# Patient Record
Sex: FEMALE | Race: BLACK OR AFRICAN AMERICAN | NOT HISPANIC OR LATINO | URBAN - METROPOLITAN AREA
[De-identification: names, ages, dates, MRNs, and addresses within clinical notes are randomized per-mention and may not be internally consistent; named-entity substitution may affect disease eponyms.]

---

## 2021-04-23 VITALS
OXYGEN SATURATION: 100 % | SYSTOLIC BLOOD PRESSURE: 178 MMHG | HEIGHT: 63 IN | HEART RATE: 73 BPM | WEIGHT: 212.31 LBS | RESPIRATION RATE: 18 BRPM | DIASTOLIC BLOOD PRESSURE: 91 MMHG | TEMPERATURE: 97 F

## 2021-04-23 RX ORDER — POVIDONE-IODINE 5 %
1 AEROSOL (ML) TOPICAL ONCE
Refills: 0 | Status: COMPLETED | OUTPATIENT
Start: 2021-06-14 | End: 2021-06-14

## 2021-04-23 NOTE — H&P ADULT - HISTORY OF PRESENT ILLNESS
Patient is 62 y/o female with low back pain present x  Patient elects to undergo L4/5 laminectomy, posterolateral fusion, TLIF with Dr. López  Patient is 62 y/o female with low back pain present x 2 years s/p work accident. She reports constant back pain. She has tried PT, oral analgesics and a cane.      Patient elects to undergo L4/5 laminectomy, posterolateral fusion, TLIF with Dr. López

## 2021-04-23 NOTE — H&P ADULT - NSHPPHYSICALEXAM_GEN_ALL_CORE
Gen: 62 y/o female, NAD  MSK: Decreased ROM secondary to pain at the lumbar spine       Rest of PE per MD clearance Gen: 62 y/o female, NAD  MSK: Decreased ROM secondary to pain at the lumbar spine   Calves soft, nontender bilaterally   Sensation intact to light touch bilateral lower extremities  DP 2+, brisk capillary refill, bilateral LE  EHL/FHL/TA/GS 5/5 bilaterally     Rest of PE per MD clearance

## 2021-04-23 NOTE — H&P ADULT - NSHPLABSRESULTS_GEN_ALL_CORE
Preop CBC, BMP, PT/PTT/INR, UA within normal limits- reviewed by medical clearance.   CXR: Within normal limits, per medical clearance.   Normal spirometry per medi clearance  Cr 0.65 preop testing  3M day of surgery   Preop EKG: NSR, reviewed by medical clearance. Preop CBC, BMP, PT/PTT/INR, UA within normal limits- reviewed by medical clearance.   CXR: Within normal limits, per medical clearance.   COVD PCR test: 06/12/21- negative.   Normal spirometry per medi clearance  Cr 0.65 preop testing  3M day of surgery   Preop EKG: NSR, reviewed by medical clearance.

## 2021-04-23 NOTE — H&P ADULT - PROBLEM SELECTOR PLAN 1
Admit to Orthopedic Service   For elective L4/5 lami/posterolateral fusion/TLIF   Medically cleared and optimized for surgery by Admit to Orthopedic Service   For elective L4/5 lami/posterolateral fusion/TLIF   Medically cleared and optimized for surgery by Dr Wiseman

## 2021-04-23 NOTE — H&P ADULT - NSICDXPASTMEDICALHX_GEN_ALL_CORE_FT
PAST MEDICAL HISTORY:  Anxiety     Breast cancer left    HLD (hyperlipidemia)     HTN (hypertension)     Kidney cysts benign    Lumbar herniated disc fall at work, 2019    OA (osteoarthritis)     Pain chronic left breast

## 2021-06-11 RX ORDER — ATORVASTATIN CALCIUM 80 MG/1
1 TABLET, FILM COATED ORAL
Qty: 0 | Refills: 0 | DISCHARGE

## 2021-06-11 RX ORDER — AMLODIPINE BESYLATE 2.5 MG/1
20 TABLET ORAL
Qty: 0 | Refills: 0 | DISCHARGE

## 2021-06-14 ENCOUNTER — INPATIENT (INPATIENT)
Facility: HOSPITAL | Age: 61
LOS: 3 days | Discharge: HOME HEALTH | DRG: 454 | End: 2021-06-18
Attending: ORTHOPAEDIC SURGERY | Admitting: ORTHOPAEDIC SURGERY
Payer: OTHER MISCELLANEOUS

## 2021-06-14 DIAGNOSIS — Z98.891 HISTORY OF UTERINE SCAR FROM PREVIOUS SURGERY: Chronic | ICD-10-CM

## 2021-06-14 DIAGNOSIS — N28.1 CYST OF KIDNEY, ACQUIRED: ICD-10-CM

## 2021-06-14 DIAGNOSIS — C50.919 MALIGNANT NEOPLASM OF UNSPECIFIED SITE OF UNSPECIFIED FEMALE BREAST: ICD-10-CM

## 2021-06-14 DIAGNOSIS — Z98.890 OTHER SPECIFIED POSTPROCEDURAL STATES: Chronic | ICD-10-CM

## 2021-06-14 DIAGNOSIS — E78.5 HYPERLIPIDEMIA, UNSPECIFIED: ICD-10-CM

## 2021-06-14 DIAGNOSIS — M43.10 SPONDYLOLISTHESIS, SITE UNSPECIFIED: ICD-10-CM

## 2021-06-14 DIAGNOSIS — I10 ESSENTIAL (PRIMARY) HYPERTENSION: ICD-10-CM

## 2021-06-14 LAB
ANION GAP SERPL CALC-SCNC: 13 MMOL/L — SIGNIFICANT CHANGE UP (ref 5–17)
BLD GP AB SCN SERPL QL: NEGATIVE — SIGNIFICANT CHANGE UP
BUN SERPL-MCNC: 10 MG/DL — SIGNIFICANT CHANGE UP (ref 7–23)
CALCIUM SERPL-MCNC: 9.6 MG/DL — SIGNIFICANT CHANGE UP (ref 8.4–10.5)
CHLORIDE SERPL-SCNC: 105 MMOL/L — SIGNIFICANT CHANGE UP (ref 96–108)
CO2 SERPL-SCNC: 23 MMOL/L — SIGNIFICANT CHANGE UP (ref 22–31)
CREAT SERPL-MCNC: 0.61 MG/DL — SIGNIFICANT CHANGE UP (ref 0.5–1.3)
GLUCOSE SERPL-MCNC: 118 MG/DL — HIGH (ref 70–99)
HCT VFR BLD CALC: 32.8 % — LOW (ref 34.5–45)
HGB BLD-MCNC: 10.7 G/DL — LOW (ref 11.5–15.5)
MAGNESIUM SERPL-MCNC: 1.7 MG/DL — SIGNIFICANT CHANGE UP (ref 1.6–2.6)
MCHC RBC-ENTMCNC: 30.1 PG — SIGNIFICANT CHANGE UP (ref 27–34)
MCHC RBC-ENTMCNC: 32.6 GM/DL — SIGNIFICANT CHANGE UP (ref 32–36)
MCV RBC AUTO: 92.1 FL — SIGNIFICANT CHANGE UP (ref 80–100)
NRBC # BLD: 0 /100 WBCS — SIGNIFICANT CHANGE UP (ref 0–0)
PHOSPHATE SERPL-MCNC: 2.9 MG/DL — SIGNIFICANT CHANGE UP (ref 2.5–4.5)
PLATELET # BLD AUTO: 219 K/UL — SIGNIFICANT CHANGE UP (ref 150–400)
POTASSIUM SERPL-MCNC: 3.4 MMOL/L — LOW (ref 3.5–5.3)
POTASSIUM SERPL-SCNC: 3.4 MMOL/L — LOW (ref 3.5–5.3)
RBC # BLD: 3.56 M/UL — LOW (ref 3.8–5.2)
RBC # FLD: 12.8 % — SIGNIFICANT CHANGE UP (ref 10.3–14.5)
RH IG SCN BLD-IMP: POSITIVE — SIGNIFICANT CHANGE UP
SODIUM SERPL-SCNC: 141 MMOL/L — SIGNIFICANT CHANGE UP (ref 135–145)
WBC # BLD: 6.06 K/UL — SIGNIFICANT CHANGE UP (ref 3.8–10.5)
WBC # FLD AUTO: 6.06 K/UL — SIGNIFICANT CHANGE UP (ref 3.8–10.5)

## 2021-06-14 PROCEDURE — 22853 INSJ BIOMECHANICAL DEVICE: CPT | Mod: AS

## 2021-06-14 PROCEDURE — 22840 INSERT SPINE FIXATION DEVICE: CPT | Mod: AS

## 2021-06-14 PROCEDURE — 22633 ARTHRD CMBN 1NTRSPC LUMBAR: CPT | Mod: AS

## 2021-06-14 PROCEDURE — 63047 LAM FACETEC & FORAMOT LUMBAR: CPT | Mod: AS,59

## 2021-06-14 PROCEDURE — 63056 DECOMPRESS SPINAL CORD LMBR: CPT | Mod: AS,59

## 2021-06-14 RX ORDER — LISINOPRIL 2.5 MG/1
20 TABLET ORAL DAILY
Refills: 0 | Status: DISCONTINUED | OUTPATIENT
Start: 2021-06-15 | End: 2021-06-16

## 2021-06-14 RX ORDER — HYDROMORPHONE HYDROCHLORIDE 2 MG/ML
30 INJECTION INTRAMUSCULAR; INTRAVENOUS; SUBCUTANEOUS
Refills: 0 | Status: DISCONTINUED | OUTPATIENT
Start: 2021-06-14 | End: 2021-06-15

## 2021-06-14 RX ORDER — HYDROCHLOROTHIAZIDE 25 MG
50 TABLET ORAL DAILY
Refills: 0 | Status: DISCONTINUED | OUTPATIENT
Start: 2021-06-15 | End: 2021-06-15

## 2021-06-14 RX ORDER — GABAPENTIN 400 MG/1
100 CAPSULE ORAL THREE TIMES A DAY
Refills: 0 | Status: DISCONTINUED | OUTPATIENT
Start: 2021-06-14 | End: 2021-06-18

## 2021-06-14 RX ORDER — HYDROMORPHONE HYDROCHLORIDE 2 MG/ML
0.5 INJECTION INTRAMUSCULAR; INTRAVENOUS; SUBCUTANEOUS
Refills: 0 | Status: DISCONTINUED | OUTPATIENT
Start: 2021-06-14 | End: 2021-06-15

## 2021-06-14 RX ORDER — ALPRAZOLAM 0.25 MG
1 TABLET ORAL
Qty: 0 | Refills: 0 | DISCHARGE

## 2021-06-14 RX ORDER — ONDANSETRON 8 MG/1
4 TABLET, FILM COATED ORAL EVERY 6 HOURS
Refills: 0 | Status: DISCONTINUED | OUTPATIENT
Start: 2021-06-14 | End: 2021-06-18

## 2021-06-14 RX ORDER — NALOXONE HYDROCHLORIDE 4 MG/.1ML
0.1 SPRAY NASAL
Refills: 0 | Status: DISCONTINUED | OUTPATIENT
Start: 2021-06-14 | End: 2021-06-18

## 2021-06-14 RX ORDER — LISINOPRIL 2.5 MG/1
1 TABLET ORAL
Qty: 0 | Refills: 0 | DISCHARGE

## 2021-06-14 RX ORDER — GABAPENTIN 400 MG/1
300 CAPSULE ORAL ONCE
Refills: 0 | Status: COMPLETED | OUTPATIENT
Start: 2021-06-14 | End: 2021-06-14

## 2021-06-14 RX ORDER — METOCLOPRAMIDE HCL 10 MG
10 TABLET ORAL ONCE
Refills: 0 | Status: DISCONTINUED | OUTPATIENT
Start: 2021-06-14 | End: 2021-06-18

## 2021-06-14 RX ORDER — MAGNESIUM HYDROXIDE 400 MG/1
30 TABLET, CHEWABLE ORAL EVERY 12 HOURS
Refills: 0 | Status: DISCONTINUED | OUTPATIENT
Start: 2021-06-14 | End: 2021-06-18

## 2021-06-14 RX ORDER — AMLODIPINE BESYLATE 2.5 MG/1
10 TABLET ORAL DAILY
Refills: 0 | Status: DISCONTINUED | OUTPATIENT
Start: 2021-06-14 | End: 2021-06-18

## 2021-06-14 RX ORDER — GABAPENTIN 400 MG/1
1 CAPSULE ORAL
Qty: 0 | Refills: 0 | DISCHARGE

## 2021-06-14 RX ORDER — ONDANSETRON 8 MG/1
4 TABLET, FILM COATED ORAL EVERY 6 HOURS
Refills: 0 | Status: DISCONTINUED | OUTPATIENT
Start: 2021-06-14 | End: 2021-06-14

## 2021-06-14 RX ORDER — SENNA PLUS 8.6 MG/1
2 TABLET ORAL AT BEDTIME
Refills: 0 | Status: DISCONTINUED | OUTPATIENT
Start: 2021-06-14 | End: 2021-06-18

## 2021-06-14 RX ORDER — POTASSIUM CHLORIDE 20 MEQ
20 PACKET (EA) ORAL ONCE
Refills: 0 | Status: COMPLETED | OUTPATIENT
Start: 2021-06-14 | End: 2021-06-15

## 2021-06-14 RX ORDER — AMLODIPINE BESYLATE 2.5 MG/1
1 TABLET ORAL
Qty: 0 | Refills: 0 | DISCHARGE

## 2021-06-14 RX ORDER — APREPITANT 80 MG/1
40 CAPSULE ORAL ONCE
Refills: 0 | Status: COMPLETED | OUTPATIENT
Start: 2021-06-14 | End: 2021-06-14

## 2021-06-14 RX ORDER — CEFAZOLIN SODIUM 1 G
2000 VIAL (EA) INJECTION EVERY 8 HOURS
Refills: 0 | Status: COMPLETED | OUTPATIENT
Start: 2021-06-15 | End: 2021-06-15

## 2021-06-14 RX ORDER — CHLORHEXIDINE GLUCONATE 213 G/1000ML
1 SOLUTION TOPICAL ONCE
Refills: 0 | Status: COMPLETED | OUTPATIENT
Start: 2021-06-14 | End: 2021-06-14

## 2021-06-14 RX ORDER — ACETAMINOPHEN 500 MG
975 TABLET ORAL EVERY 8 HOURS
Refills: 0 | Status: DISCONTINUED | OUTPATIENT
Start: 2021-06-14 | End: 2021-06-18

## 2021-06-14 RX ORDER — MELOXICAM 15 MG/1
1 TABLET ORAL
Qty: 0 | Refills: 0 | DISCHARGE

## 2021-06-14 RX ORDER — ALPRAZOLAM 0.25 MG
2 TABLET ORAL THREE TIMES A DAY
Refills: 0 | Status: DISCONTINUED | OUTPATIENT
Start: 2021-06-14 | End: 2021-06-18

## 2021-06-14 RX ORDER — SODIUM CHLORIDE 9 MG/ML
1000 INJECTION, SOLUTION INTRAVENOUS
Refills: 0 | Status: DISCONTINUED | OUTPATIENT
Start: 2021-06-14 | End: 2021-06-18

## 2021-06-14 RX ORDER — ACETAMINOPHEN 500 MG
1000 TABLET ORAL ONCE
Refills: 0 | Status: COMPLETED | OUTPATIENT
Start: 2021-06-14 | End: 2021-06-14

## 2021-06-14 RX ORDER — BUPIVACAINE 13.3 MG/ML
20 INJECTION, SUSPENSION, LIPOSOMAL INFILTRATION ONCE
Refills: 0 | Status: DISCONTINUED | OUTPATIENT
Start: 2021-06-14 | End: 2021-06-18

## 2021-06-14 RX ADMIN — Medication 1000 MILLIGRAM(S): at 15:31

## 2021-06-14 RX ADMIN — GABAPENTIN 300 MILLIGRAM(S): 400 CAPSULE ORAL at 15:30

## 2021-06-14 RX ADMIN — HYDROMORPHONE HYDROCHLORIDE 30 MILLILITER(S): 2 INJECTION INTRAMUSCULAR; INTRAVENOUS; SUBCUTANEOUS at 22:37

## 2021-06-14 RX ADMIN — SODIUM CHLORIDE 120 MILLILITER(S): 9 INJECTION, SOLUTION INTRAVENOUS at 21:08

## 2021-06-14 RX ADMIN — Medication 1 APPLICATION(S): at 15:29

## 2021-06-14 RX ADMIN — APREPITANT 40 MILLIGRAM(S): 80 CAPSULE ORAL at 15:31

## 2021-06-14 RX ADMIN — Medication 975 MILLIGRAM(S): at 22:57

## 2021-06-14 RX ADMIN — GABAPENTIN 100 MILLIGRAM(S): 400 CAPSULE ORAL at 22:58

## 2021-06-14 RX ADMIN — CHLORHEXIDINE GLUCONATE 1 APPLICATION(S): 213 SOLUTION TOPICAL at 15:31

## 2021-06-14 NOTE — BRIEF OPERATIVE NOTE - COMMENTS
Episodes of intraoperative bradyarrhythmia reported by anesthesia.  Plan for EKG in PACU and Cardiology consultation. Pt to go to telemetry postop

## 2021-06-14 NOTE — BRIEF OPERATIVE NOTE - NSICDXBRIEFPROCEDURE_GEN_ALL_CORE_FT
PROCEDURES:  TLIF, 1 level, posterior approach 14-Jun-2021 20:03:22 L4-L5 Alton Harden  Fusion, posterior spinal column, lumbar, 1 level, posterior approach 14-Jun-2021 20:03:44 L4-L5 Alton Harden

## 2021-06-14 NOTE — CONSULT NOTE ADULT - ASSESSMENT
Patient is 62 y/o female with low back pain present x 2 years s/p work accident who is now s/p L4/5 laminectomy/posterolateral fusion. During surgery patient had intraoperative bradyarrhythmia (per report by anesthesia) for which cardiology is consulted. Patient now in sinus.     #Concern for bradyarrhythmia  Patient now back in sinus (sinus arrhythmia). No strip of bradyarrhythmia but likely 2/2 anesthesia (received versed, , precedex, ketamine).   - continue on TELE  - avoid AV oswald blockade agents  - non-urgent TTE in AM    Recs not final until attested by cardiology attending.    62 y/o female with low back pain x 2 years s/p work accident who is now s/p L4/5 laminectomy/posterolateral fusion. During surgery patient had intraoperative bradyarrhythmia (per report by anesthesia) for which cardiology is consulted. Patient now in sinus.     #Concern for bradyarrhythmia  Patient now back in sinus (sinus arrhythmia). No strips of bradyarrhythmia reported but likely 2/2 anesthesia (received versed, , precedex, ketamine).   - continue on TELE  - avoid AV oswald blockade agents  - non-urgent TTE in AM    Recs not final until attested by cardiology attending.

## 2021-06-15 DIAGNOSIS — D62 ACUTE POSTHEMORRHAGIC ANEMIA: ICD-10-CM

## 2021-06-15 DIAGNOSIS — I10 ESSENTIAL (PRIMARY) HYPERTENSION: ICD-10-CM

## 2021-06-15 DIAGNOSIS — I49.9 CARDIAC ARRHYTHMIA, UNSPECIFIED: ICD-10-CM

## 2021-06-15 DIAGNOSIS — C50.919 MALIGNANT NEOPLASM OF UNSPECIFIED SITE OF UNSPECIFIED FEMALE BREAST: ICD-10-CM

## 2021-06-15 DIAGNOSIS — E78.1 PURE HYPERGLYCERIDEMIA: ICD-10-CM

## 2021-06-15 DIAGNOSIS — Z98.890 OTHER SPECIFIED POSTPROCEDURAL STATES: ICD-10-CM

## 2021-06-15 LAB
ANION GAP SERPL CALC-SCNC: 11 MMOL/L — SIGNIFICANT CHANGE UP (ref 5–17)
BASOPHILS # BLD AUTO: 0.01 K/UL — SIGNIFICANT CHANGE UP (ref 0–0.2)
BASOPHILS NFR BLD AUTO: 0.1 % — SIGNIFICANT CHANGE UP (ref 0–2)
BUN SERPL-MCNC: 11 MG/DL — SIGNIFICANT CHANGE UP (ref 7–23)
CALCIUM SERPL-MCNC: 9.3 MG/DL — SIGNIFICANT CHANGE UP (ref 8.4–10.5)
CHLORIDE SERPL-SCNC: 104 MMOL/L — SIGNIFICANT CHANGE UP (ref 96–108)
CO2 SERPL-SCNC: 24 MMOL/L — SIGNIFICANT CHANGE UP (ref 22–31)
COVID-19 SPIKE DOMAIN AB INTERP: NEGATIVE — SIGNIFICANT CHANGE UP
COVID-19 SPIKE DOMAIN ANTIBODY RESULT: 0.4 U/ML — SIGNIFICANT CHANGE UP
CREAT SERPL-MCNC: 0.65 MG/DL — SIGNIFICANT CHANGE UP (ref 0.5–1.3)
EOSINOPHIL # BLD AUTO: 0 K/UL — SIGNIFICANT CHANGE UP (ref 0–0.5)
EOSINOPHIL NFR BLD AUTO: 0 % — SIGNIFICANT CHANGE UP (ref 0–6)
GLUCOSE SERPL-MCNC: 139 MG/DL — HIGH (ref 70–99)
HCT VFR BLD CALC: 31 % — LOW (ref 34.5–45)
HCV AB S/CO SERPL IA: 0.03 S/CO — SIGNIFICANT CHANGE UP
HCV AB SERPL-IMP: SIGNIFICANT CHANGE UP
HGB BLD-MCNC: 10.2 G/DL — LOW (ref 11.5–15.5)
IMM GRANULOCYTES NFR BLD AUTO: 0.4 % — SIGNIFICANT CHANGE UP (ref 0–1.5)
LYMPHOCYTES # BLD AUTO: 0.58 K/UL — LOW (ref 1–3.3)
LYMPHOCYTES # BLD AUTO: 8 % — LOW (ref 13–44)
MCHC RBC-ENTMCNC: 29.5 PG — SIGNIFICANT CHANGE UP (ref 27–34)
MCHC RBC-ENTMCNC: 32.9 GM/DL — SIGNIFICANT CHANGE UP (ref 32–36)
MCV RBC AUTO: 89.6 FL — SIGNIFICANT CHANGE UP (ref 80–100)
MONOCYTES # BLD AUTO: 0.09 K/UL — SIGNIFICANT CHANGE UP (ref 0–0.9)
MONOCYTES NFR BLD AUTO: 1.2 % — LOW (ref 2–14)
NEUTROPHILS # BLD AUTO: 6.54 K/UL — SIGNIFICANT CHANGE UP (ref 1.8–7.4)
NEUTROPHILS NFR BLD AUTO: 90.3 % — HIGH (ref 43–77)
NRBC # BLD: 0 /100 WBCS — SIGNIFICANT CHANGE UP (ref 0–0)
PLATELET # BLD AUTO: 234 K/UL — SIGNIFICANT CHANGE UP (ref 150–400)
POTASSIUM SERPL-MCNC: 4.3 MMOL/L — SIGNIFICANT CHANGE UP (ref 3.5–5.3)
POTASSIUM SERPL-SCNC: 4.3 MMOL/L — SIGNIFICANT CHANGE UP (ref 3.5–5.3)
RBC # BLD: 3.46 M/UL — LOW (ref 3.8–5.2)
RBC # FLD: 12.8 % — SIGNIFICANT CHANGE UP (ref 10.3–14.5)
SARS-COV-2 IGG+IGM SERPL QL IA: 0.4 U/ML — SIGNIFICANT CHANGE UP
SARS-COV-2 IGG+IGM SERPL QL IA: NEGATIVE — SIGNIFICANT CHANGE UP
SODIUM SERPL-SCNC: 139 MMOL/L — SIGNIFICANT CHANGE UP (ref 135–145)
WBC # BLD: 7.25 K/UL — SIGNIFICANT CHANGE UP (ref 3.8–10.5)
WBC # FLD AUTO: 7.25 K/UL — SIGNIFICANT CHANGE UP (ref 3.8–10.5)

## 2021-06-15 PROCEDURE — 99253 IP/OBS CNSLTJ NEW/EST LOW 45: CPT

## 2021-06-15 PROCEDURE — 93306 TTE W/DOPPLER COMPLETE: CPT | Mod: 26

## 2021-06-15 PROCEDURE — 99223 1ST HOSP IP/OBS HIGH 75: CPT | Mod: GC

## 2021-06-15 RX ORDER — HYDROMORPHONE HYDROCHLORIDE 2 MG/ML
1 INJECTION INTRAMUSCULAR; INTRAVENOUS; SUBCUTANEOUS
Refills: 0 | Status: DISCONTINUED | OUTPATIENT
Start: 2021-06-15 | End: 2021-06-16

## 2021-06-15 RX ORDER — CYCLOBENZAPRINE HYDROCHLORIDE 10 MG/1
5 TABLET, FILM COATED ORAL THREE TIMES A DAY
Refills: 0 | Status: DISCONTINUED | OUTPATIENT
Start: 2021-06-15 | End: 2021-06-15

## 2021-06-15 RX ORDER — BENZOCAINE AND MENTHOL 5; 1 G/100ML; G/100ML
2 LIQUID ORAL EVERY 4 HOURS
Refills: 0 | Status: DISCONTINUED | OUTPATIENT
Start: 2021-06-15 | End: 2021-06-15

## 2021-06-15 RX ORDER — BENZOCAINE AND MENTHOL 5; 1 G/100ML; G/100ML
2 LIQUID ORAL EVERY 4 HOURS
Refills: 0 | Status: DISCONTINUED | OUTPATIENT
Start: 2021-06-15 | End: 2021-06-18

## 2021-06-15 RX ORDER — BENZOCAINE AND MENTHOL 5; 1 G/100ML; G/100ML
1 LIQUID ORAL ONCE
Refills: 0 | Status: DISCONTINUED | OUTPATIENT
Start: 2021-06-15 | End: 2021-06-15

## 2021-06-15 RX ORDER — OXYCODONE HYDROCHLORIDE 5 MG/1
30 TABLET ORAL EVERY 6 HOURS
Refills: 0 | Status: DISCONTINUED | OUTPATIENT
Start: 2021-06-15 | End: 2021-06-18

## 2021-06-15 RX ORDER — CYCLOBENZAPRINE HYDROCHLORIDE 10 MG/1
5 TABLET, FILM COATED ORAL THREE TIMES A DAY
Refills: 0 | Status: DISCONTINUED | OUTPATIENT
Start: 2021-06-15 | End: 2021-06-18

## 2021-06-15 RX ORDER — BENZOCAINE AND MENTHOL 5; 1 G/100ML; G/100ML
1 LIQUID ORAL ONCE
Refills: 0 | Status: COMPLETED | OUTPATIENT
Start: 2021-06-15 | End: 2021-06-15

## 2021-06-15 RX ADMIN — GABAPENTIN 100 MILLIGRAM(S): 400 CAPSULE ORAL at 13:48

## 2021-06-15 RX ADMIN — HYDROMORPHONE HYDROCHLORIDE 0.5 MILLIGRAM(S): 2 INJECTION INTRAMUSCULAR; INTRAVENOUS; SUBCUTANEOUS at 08:21

## 2021-06-15 RX ADMIN — HYDROMORPHONE HYDROCHLORIDE 0.5 MILLIGRAM(S): 2 INJECTION INTRAMUSCULAR; INTRAVENOUS; SUBCUTANEOUS at 12:14

## 2021-06-15 RX ADMIN — AMLODIPINE BESYLATE 10 MILLIGRAM(S): 2.5 TABLET ORAL at 09:45

## 2021-06-15 RX ADMIN — Medication 975 MILLIGRAM(S): at 00:27

## 2021-06-15 RX ADMIN — Medication 975 MILLIGRAM(S): at 21:08

## 2021-06-15 RX ADMIN — Medication 975 MILLIGRAM(S): at 15:03

## 2021-06-15 RX ADMIN — HYDROMORPHONE HYDROCHLORIDE 0.5 MILLIGRAM(S): 2 INJECTION INTRAMUSCULAR; INTRAVENOUS; SUBCUTANEOUS at 12:29

## 2021-06-15 RX ADMIN — Medication 50 MILLIEQUIVALENT(S): at 00:46

## 2021-06-15 RX ADMIN — BENZOCAINE AND MENTHOL 1 LOZENGE: 5; 1 LIQUID ORAL at 13:22

## 2021-06-15 RX ADMIN — Medication 975 MILLIGRAM(S): at 07:53

## 2021-06-15 RX ADMIN — GABAPENTIN 100 MILLIGRAM(S): 400 CAPSULE ORAL at 05:58

## 2021-06-15 RX ADMIN — LISINOPRIL 20 MILLIGRAM(S): 2.5 TABLET ORAL at 09:45

## 2021-06-15 RX ADMIN — Medication 2000 MILLIGRAM(S): at 00:32

## 2021-06-15 RX ADMIN — GABAPENTIN 100 MILLIGRAM(S): 400 CAPSULE ORAL at 21:08

## 2021-06-15 RX ADMIN — Medication 975 MILLIGRAM(S): at 05:58

## 2021-06-15 RX ADMIN — Medication 50 MILLIGRAM(S): at 06:19

## 2021-06-15 RX ADMIN — HYDROMORPHONE HYDROCHLORIDE 0.5 MILLIGRAM(S): 2 INJECTION INTRAMUSCULAR; INTRAVENOUS; SUBCUTANEOUS at 09:17

## 2021-06-15 RX ADMIN — Medication 975 MILLIGRAM(S): at 21:58

## 2021-06-15 RX ADMIN — Medication 975 MILLIGRAM(S): at 13:48

## 2021-06-15 RX ADMIN — Medication 2 MILLIGRAM(S): at 21:08

## 2021-06-15 RX ADMIN — Medication 2000 MILLIGRAM(S): at 07:44

## 2021-06-15 NOTE — CONSULT NOTE ADULT - ATTENDING COMMENTS
POD # 1. Pt seen and evaluated for pain control. Chart reviewed, plan discussed and agreed. Reports adequate pain control, no side effects. We will continue the current regimen, and titrate to side effects and pain control. Dr. Rojo

## 2021-06-15 NOTE — PROGRESS NOTE ADULT - SUBJECTIVE AND OBJECTIVE BOX
Ortho Post Op Check    Procedure: Lami, TLIF/PSF L4-L5   Surgeon: Maribel    Pt seen and examined in PACU, comfortable without complaints, pain controlled, in sinus. Denies CP, SOB, N/V, numbness/tingling     Vital Signs Last 24 Hrs  T(C): 36.2 (06-14-21 @ 20:46), Max: 36.2 (06-14-21 @ 20:46)  T(F): 97.2 (06-14-21 @ 20:46), Max: 97.2 (06-14-21 @ 20:46)  HR: 75 (06-14-21 @ 23:11) (63 - 80)  BP: 139/80 (06-14-21 @ 23:11) (131/67 - 188/98)  BP(mean): 104 (06-14-21 @ 23:11) (94 - 133)  RR: 18 (06-14-21 @ 23:11) (0 - 20)  SpO2: 99% (06-14-21 @ 23:11) (95% - 100%)  AVSS    General: Pt Alert and oriented, NAD  Back DSG C/D/I  HV x2 in place holding suction  Carter in place  Sensation intact BL LE  Motor intact BL LE    Drain output:    06-14-21 @ 07:01  -  06-15-21 @ 00:24  --------------------------------------------------------  OUT:    Accordian (mL): 40 mL    Accordian (mL): 30 mL  Total OUT: 70 mL      A/P: 61yFemale POD#0 s/p above procedure   - Stable  - Appreciate Cards recs - f/u TTE in AM  - Labs stable   - Pain Control  - DVT ppx: SCDs  - Post op abx: ancef periop  - PT, WBS: WBAT  - Dispo planning     Ortho Pager [phone]

## 2021-06-15 NOTE — PHYSICAL THERAPY INITIAL EVALUATION ADULT - ADDITIONAL COMMENTS
Pt lives in an elevator access apartment with family and has no steps to enter. Pt reports to use cane to ambulate prior to sx.

## 2021-06-15 NOTE — CONSULT NOTE ADULT - SUBJECTIVE AND OBJECTIVE BOX
Patient is a 61y old  Female who presents with a chief complaint of low back pain (15 Jose Alfredo 2021 09:51)      HPI:  Patient is 62 y/o female with low back pain present x 2 years s/p work accident. She reports constant back pain. She has tried PT, oral analgesics and a cane.      Patient elects to undergo L4/5 laminectomy, posterolateral fusion, TLIF with Dr. López  (2021 13:24)      PAST MEDICAL & SURGICAL HISTORY:  HTN (hypertension)    HLD (hyperlipidemia)  denies    Breast cancer  left    Lumbar herniated disc  fall at work, 2019    Kidney cysts  benign    OA (osteoarthritis)    Anxiety    Pain  chronic left breast    S/P   x4    S/P lumpectomy, left breast        FAMILY HISTORY:      SOCIAL HISTORY:  Smoking Status: [ ] Current, [ ] Former, [ ] Never  Pack Years:    MEDICATIONS:  Pulmonary:    Antimicrobials:    Anticoagulants:    Onc:    GI/:  magnesium hydroxide Suspension 30 milliLiter(s) Oral every 12 hours PRN  senna 2 Tablet(s) Oral at bedtime    Endocrine:    Cardiac:  amLODIPine   Tablet 10 milliGRAM(s) Oral daily  lisinopril 20 milliGRAM(s) Oral daily    Other Medications:  acetaminophen   Tablet .. 975 milliGRAM(s) Oral every 8 hours  ALPRAZolam 2 milliGRAM(s) Oral three times a day PRN  BUpivacaine liposome 1.3% Injectable (no eMAR) 20 milliLiter(s) Local Injection once  gabapentin 100 milliGRAM(s) Oral three times a day  HYDROmorphone  Injectable 0.5 milliGRAM(s) IV Push every 15 minutes PRN  HYDROmorphone  Injectable 1 milliGRAM(s) IV Push every 2 hours PRN  HYDROmorphone PCA (1 mG/mL) Rescue Clinician Bolus 0.5 milliGRAM(s) IV Push every 1 hour PRN  lactated ringers. 1000 milliLiter(s) IV Continuous <Continuous>  metoclopramide Injectable 10 milliGRAM(s) IV Push once PRN  naloxone Injectable 0.1 milliGRAM(s) IV Push every 3 minutes PRN  ondansetron Injectable 4 milliGRAM(s) IV Push every 6 hours PRN  oxyCODONE    IR 30 milliGRAM(s) Oral every 6 hours PRN      Allergies    No Known Allergies    Intolerances        Review of Systems:   •	General: negative  •	Skin/Breast: negative  •	Ophthalmologic: negative  •	ENMT: negative  •	Respiratory and Thorax: negative  •	Cardiovascular: negative  •	Gastrointestinal: negative  •	Genitourinary: negative  •	Musculoskeletal: negative  •	Neurological: negative  •	Psychiatric: negative  •	Hematology/Lymphatics: negative  •	Endocrine: negative  •	Allergic/Immunologic: negative    Physical Exam:   • Constitutional:	refer to dietitian/nutritionist note  • Eyes:	EOMI; PERRL; no drainage or redness  • ENMT:	No oral lesions; no gross abnormalities  • Neck	No bruits; no thyromegaly or nodules  • Breasts:	not examined  • Back:	No deformity or limitation of movement  • Respiratory:	Breath Sounds equal & clear to percussion & auscultation, no accessory muscle use  • Cardiovascular:	Regular rate & rhythm, normal S1, S2; no murmurs, gallops or rubs; no S3, S4  • Gastrointestinal:	Soft, non-tender, no hepatosplenomegaly, normal bowel sounds  • Genitourinary:	not examined  • Rectal: not examined  • Extremities:	No cyanosis, clubbing or edema  • Vascular:	Equal and normal pulses (carotid, femoral, dorsalis pedis)  • Neurologica:l	not examined  • Skin:	No lesions; no rash  • Lymph Nodes:	No lymphadedenopathy  • Musculoskeletal:	No joint pain, swelling or deformity; no limitation of movement      Vital Signs Last 24 Hrs  T(C): 36.2 (15 Jose Lafredo 2021 06:00), Max: 36.2 (2021 20:46)  T(F): 97.1 (15 Jose Alfredo 2021 06:00), Max: 97.2 (2021 20:46)  HR: 54 (15 Jose Alfredo 2021 09:00) (54 - 80)  BP: 115/68 (15 Jose Alfredo 2021 09:00) (114/66 - 188/98)  BP(mean): 87 (15 Jose Alfredo 2021 09:00) (85 - 133)  RR: 15 (15 Jose Alfredo 2021 09:00) (0 - 20)  SpO2: 99% (15 Jose Alfredo 2021 09:00) (95% - 100%)    -14 @ 07:01  -  -15 @ 07:00  --------------------------------------------------------  IN: 1200 mL / OUT: 755 mL / NET: 445 mL    06-15 @ 07:01  -  06-15 @ 10:23  --------------------------------------------------------  IN: 120 mL / OUT: 150 mL / NET: -30 mL          LABS:      CBC Full  -  ( 15 Jose Alfredo 2021 05:09 )  WBC Count : 7.25 K/uL  RBC Count : 3.46 M/uL  Hemoglobin : 10.2 g/dL  Hematocrit : 31.0 %  Platelet Count - Automated : 234 K/uL  Mean Cell Volume : 89.6 fl  Mean Cell Hemoglobin : 29.5 pg  Mean Cell Hemoglobin Concentration : 32.9 gm/dL  Auto Neutrophil # : 6.54 K/uL  Auto Lymphocyte # : 0.58 K/uL  Auto Monocyte # : 0.09 K/uL  Auto Eosinophil # : 0.00 K/uL  Auto Basophil # : 0.01 K/uL  Auto Neutrophil % : 90.3 %  Auto Lymphocyte % : 8.0 %  Auto Monocyte % : 1.2 %  Auto Eosinophil % : 0.0 %  Auto Basophil % : 0.1 %    06-15    139  |  104  |  11  ----------------------------<  139<H>  4.3   |  24  |  0.65    Ca    9.3      15 Jose Alfredo 2021 05:09  Phos  2.9       Mg     1.7                             RADIOLOGY & ADDITIONAL STUDIES (The following images were personally reviewed):

## 2021-06-15 NOTE — CONSULT NOTE ADULT - SUBJECTIVE AND OBJECTIVE BOX
Pain Management Consult Note - [facility] [phone]    Chief Complaint: low back pain     HPI: Patient seen and examined today. This is a 62 y/o female PMH of HLD, OA, kidney cysts, breast cancer, HTN POD #1 s/p TLIF/PSF L4-5 with Dr. López. Patient reports back pain began in 2019 after a fall. Patient reports pain radiates down the BLEs associated with numbness and tingling. At home, patient reports taking Oxycodone 30 mg PO QID, which she states is prescribed for her breast cancer. Patient I-STOP positive for Oxycodone 30 mg PO QID, Xanax 2 mg PO TID last filled 2021. Patient as placed on Dilaudid 0.3 mg PCA overnight and reports this did not adequately manage her pain. Patient was switched to Oxycodone 30 mg PO Q6 hr PRN and Dilaudid 1 mg PO Q2h PRN and reports adequate pain control. Patient denies side effects from current pain regimen.    Pain is _X__ sharp ____dull ___burning ___achy ___ Intensity: ____ mild _X__mod _X__severe     Location __X__surgical site ____cervical ___X__lumbar ____abd ____upper ext____lower ext    Worse with X____activity __X__movement _____physical therapy___ Rest    Improved with __X__medication ____rest ____physical therapy    ROS: Const:  _N__febrile   Eyes:_N__ENT:___CV: _N__chest pain  Resp: __N__sob  GI:__N_nausea _N__vomiting _N__abd pain ___npo ___clears _Y_full diet __bm  :___ Musk: __Y_pain _N__spasm  Skin:___ Neuro:  __N_sedation__N_confusion_Y__ numbness _N__weakness ___Nparesth  Psych:_Y_anxiety  Endo:___ Heme:N___Allergy:_____NKDA____, _Y__all others reviewed and negative    PAST MEDICAL & SURGICAL HISTORY:  HTN (hypertension)  HLD (hyperlipidemia)-denies  Breast cancer-left  Lumbar herniated disc-fall at work, 2019  Kidney cysts-benign  OA (osoarthritis)  Anxiety  Pain-chronic left breast  S/P [id number]  S/P lumpectomy, left breast    SH: _N__Tobacco   _N__Alcohol                          FH:FAMILY HISTORY: Mother:  of heart attack    acetaminophen   Tablet .. 975 milliGRAM(s) Oral every 8 hours  ALPRAZolam 2 milliGRAM(s) Oral three times a day PRN  amLODIPine   Tablet 10 milliGRAM(s) Oral daily  benzocaine 15 mG/menthol 3.6 mG Lozenge 1 Lozenge Oral once PRN  BUpivacaine liposome 1.3% Injectable (no eMAR) 20 milliLiter(s) Local Injection once  gabapentin 100 milliGRAM(s) Oral three times a day  HYDROmorphone  Injectable 0.5 milliGRAM(s) IV Push every 15 minutes PRN  HYDROmorphone  Injectable 1 milliGRAM(s) IV Push every 2 hours PRN  HYDROmorphone PCA (1 mG/mL) Rescue Clinician Bolus 0.5 milliGRAM(s) IV Push every 1 hour PRN  lactated ringers. 1000 milliLiter(s) IV Continuous <Continuous>  lisinopril 20 milliGRAM(s) Oral daily  magnesium hydroxide Suspension 30 milliLiter(s) Oral every 12 hours PRN  metoclopramide Injectable 10 milliGRAM(s) IV Push once PRN  naloxone Injectable 0.1 milliGRAM(s) IV Push every 3 minutes PRN  ondansetron Injectable 4 milliGRAM(s) IV Push every 6 hours PRN  oxyCODONE    IR 30 milliGRAM(s) Oral every 6 hours PRN  senna 2 Tablet(s) Oral at bedtime      T(C): 36.2 (06-15-21 @ 06:00), Max: 36.2 (21 @ 20:46)  HR: 66 (06-15-21 @ 12:40) (54 - 80)  BP: 104/59 (06-15-21 @ 12:00) (104/59 - 188/98)  RR: 13 (06-15-21 @ 12:40) (0 - 22)  SpO2: 99% (06-15-21 @ 12:40) (95% - 100%)  Wt(kg): --    T(C): 36.2 (06-15-21 @ 06:00), Max: 36.2 (21 @ 20:46)  HR: 66 (06-15-21 @ 12:40) (54 - 80)  BP: 104/59 (06-15-21 @ 12:00) (104/59 - 188/98)  RR: 13 (06-15-21 @ 12:40) (0 - 22)  SpO2: 99% (06-15-21 @ 12:40) (95% - 100%)  Wt(kg): --    T(C): 36.2 (06-15-21 @ 06:00), Max: 36.2 (21 @ 20:46)  HR: 66 (06-15-21 @ 12:40) (54 - 80)  BP: 104/59 (06-15-21 @ 12:00) (104/59 - 188/98)  RR: 13 (06-15-21 @ 12:40) (0 - 22)  SpO2: 99% (06-15-21 @ 12:40) (95% - 100%)  Wt(kg): --    PHYSICAL EXAM:  Gen Appearance: _X__no acute distress __X_appropriate        Neuro: _X__SILT feet____ EOM Intact Psych: AAOX3__, X___mood/affect appropriate        Eyes: __X_conjunctiva WNL  ____X_ Pupils equal and round        ENT: X___ears and nose atraumatic__X_ Hearing grossly intact        Neck: _X__trachea midline, no visible masses ___thyroid without palpable mass    Resp: __X_Nml WOB____No tactile fremitus ___clear to auscultation    Cardio: __X_extremities free from edema ____pedal pulses palpable    GI/Abdomen: ___soft _____ Nontender___X___Nondistended_____HSM    Lymphatic: ___no palpable nodes in neck  ___no palpable nodes calves and feet    Skin/Wound: _X__Incision, __X_Dressing c/d/i,   _X___surrounding tissues soft,  _X__drain/chest tube present____    Muscular: EHL _5__/5  Gastrocnemius___5/5    __X_absent clubbing/cyanosis      ASSESSMENT: This is a 61y old Female with a history of HLD, OA, kidney cysts, breast cancer, HTN POD #1 s/p TLIF/PSF L4-5 with Dr. López.    Recommended Treatment PLAN:  1. Patient reports adequate pain control with current pain regimen. Continue Oxycodone 30 mg PO Q6 hr PRN severe pain   2. Continue Dilaudid 1 mg PO Q2h PRN breakthrough pain  3. Continue Tylenol 975 mg PO TID  4. Start Flexeril 5 mg PO Q8h PRN muscle spasm  Plan discussed with Dr. Rojo

## 2021-06-15 NOTE — CONSULT NOTE ADULT - TIME BILLING
reviewed
Patient seen and examined with house-staff during bedside rounds.  Resident note read, including vitals, physical findings, laboratory data, and radiological reports.   Revisions included below.  Direct personal management at bed side and extensive interpretation of the data.  Plan was outlined and discussed in details with the housestaff.  Decision making of high complexity  Action taken for acute disease activity to reflect the level of care provided:  - medication reconciliation  - review laboratory data

## 2021-06-15 NOTE — CONSULT NOTE ADULT - NSCONSULTADDITIONALINFOA_GEN_ALL_CORE
POD nu 1  s/p L spine  OOB  PT  VTE: DCD  HTN: controlled  Ca Breast--no rx indicated.  Pain controlled  Medication reviewed

## 2021-06-15 NOTE — PROGRESS NOTE ADULT - SUBJECTIVE AND OBJECTIVE BOX
INTERVAL EVENTS:  naeo  telemetry reivewed, no bradyarrhythmias      MEDICATIONS  (STANDING):  acetaminophen   Tablet .. 975 milliGRAM(s) Oral every 8 hours  amLODIPine   Tablet 10 milliGRAM(s) Oral daily  BUpivacaine liposome 1.3% Injectable (no eMAR) 20 milliLiter(s) Local Injection once  gabapentin 100 milliGRAM(s) Oral three times a day  lactated ringers. 1000 milliLiter(s) (120 mL/Hr) IV Continuous <Continuous>  lisinopril 20 milliGRAM(s) Oral daily  senna 2 Tablet(s) Oral at bedtime    MEDICATIONS  (PRN):  ALPRAZolam 2 milliGRAM(s) Oral three times a day PRN anxiety  cyclobenzaprine 5 milliGRAM(s) Oral three times a day PRN Muscle Spasm  HYDROmorphone  Injectable 0.5 milliGRAM(s) IV Push every 15 minutes PRN breakthrough pain  HYDROmorphone  Injectable 1 milliGRAM(s) IV Push every 2 hours PRN breakthrough pain  magnesium hydroxide Suspension 30 milliLiter(s) Oral every 12 hours PRN Constipation  metoclopramide Injectable 10 milliGRAM(s) IV Push once PRN Nausea and/or Vomiting  naloxone Injectable 0.1 milliGRAM(s) IV Push every 3 minutes PRN For ANY of the following changes in patient status:  A. RR LESS THAN 10 breaths per minute, B. Oxygen saturation LESS THAN 90%, C. Sedation score of 6  ondansetron Injectable 4 milliGRAM(s) IV Push every 6 hours PRN Nausea  oxyCODONE    IR 30 milliGRAM(s) Oral every 6 hours PRN Severe Pain (7 - 10)      Home Medications:  amLODIPine 10 mg oral tablet: 1 tab(s) orally once a day (14 Jun 2021 15:45)  gabapentin 100 mg oral capsule: 1 cap(s) orally 3 times a day (14 Jun 2021 15:45)  hydroCHLOROthiazide 50 mg oral tablet: 1 tab(s) orally once a day (14 Jun 2021 15:45)  lisinopril 20 mg oral tablet: 1 tab(s) orally once a day (14 Jun 2021 15:45)  meloxicam 7.5 mg oral tablet: 1 tab(s) orally once a day, As Needed (14 Jun 2021 15:45)  oxyCODONE 30 mg oral tablet: 1 tab(s) orally every 6 hours, As Needed (14 Jun 2021 15:45)  Xanax 2 mg oral tablet: 1 tab(s) orally 3 times a day, As Needed (14 Jun 2021 15:45)      Vital Signs Last 24 Hrs  T(C): 36.1 (15 Jose Alfredo 2021 14:00), Max: 36.2 (14 Jun 2021 20:46)  T(F): 97 (15 Jose Alfredo 2021 14:00), Max: 97.2 (14 Jun 2021 20:46)  HR: 73 (15 Jose Alfredo 2021 15:00) (45 - 91)  BP: 97/53 (15 Jose Alfredo 2021 14:48) (96/52 - 188/98)  BP(mean): 71 (15 Jose Alfredo 2021 14:48) (68 - 133)  RR: 15 (15 Jose Alfredo 2021 15:00) (0 - 22)  SpO2: 100% (15 Jose Alfredo 2021 15:00) (95% - 100%)     PHYSICAL EXAM:  GEN: Awake, alert. NAD.   HEENT: NCAT, PERRL, EOMI. Mucosa moist. No JVD.  RESP: CTA b/l  CV: RRR. Normal S1/S2. No m/r/g.  ABD: Soft. NT/ND. BS+  EXT: Warm. No edema, clubbing, or cyanosis.   NEURO: AAOx3. No focal deficits.     LABS:                        10.2   7.25  )-----------( 234      ( 15 Jose Alfredo 2021 05:09 )             31.0     06-15    139  |  104  |  11  ----------------------------<  139<H>  4.3   |  24  |  0.65    Ca    9.3      15 Jose Alfredo 2021 05:09  Phos  2.9     06-14  Mg     1.7     06-14              I&O's Summary    14 Jun 2021 07:01  -  15 Jose Alfredo 2021 07:00  --------------------------------------------------------  IN: 1200 mL / OUT: 755 mL / NET: 445 mL    15 Jose Alfredo 2021 07:01  -  15 Jose Alfredo 2021 15:42  --------------------------------------------------------  IN: 1020 mL / OUT: 535 mL / NET: 485 mL      A/P:  62 y/o female with low back pain x 2 years s/p work accident who is now s/p L4/5 laminectomy/posterolateral fusion. During surgery patient had intraoperative bradyarrhythmia (per report by anesthesia) for which cardiology is consulted. Patient now in sinus.     #Concern for bradyarrhythmia  Patient now back in sinus (sinus arrhythmia). No strips of bradyarrhythmia reported but likely 2/2 anesthesia (received versed, , precedex, ketamine).   Telemetry reviewed, no bradyarrthymias      Please reconsult as needed  D/w cardiology attending  Skinny Mercado MD PGY4

## 2021-06-15 NOTE — CONSULT NOTE ADULT - SUBJECTIVE AND OBJECTIVE BOX
STEPHANY GUZMAN      Patient is a 61y old  Female who presents with a chief complaint of low back pain (15 Jose Alfredo 2021 13:14)      HPI:  Patient is 62 y/o female with low back pain present x 2 years s/p work accident. She reports constant back pain. She has tried PT, oral analgesics and a cane.      Patient elects to undergo L4/5 laminectomy, posterolateral fusion, TLIF with Dr. López  (2021 13:24)      Addl  Medical issues:       HEALTH ISSUES - PROBLEM Dx:  Spondylolisthesis  Spondylolisthesis    HTN (hypertension)  HTN (hypertension)    HLD (hyperlipidemia)  HLD (hyperlipidemia)    Kidney cysts  Kidney cysts    Breast cancer  Breast cancer    Pure hypertriglyceridemia  Pure hypertriglyceridemia    Essential hypertension  Essential hypertension    Malignant neoplasm of female breast, unspecified estrogen receptor status, unspecified laterality, unspecified site of breast  Malignant neoplasm of female breast, unspecified estrogen receptor status, unspecified laterality, unspecified site of breast    Anemia due to acute blood loss  Anemia due to acute blood loss    Postoperative state  Postoperative state    Cardiac arrhythmia, unspecified cardiac arrhythmia type  Cardiac arrhythmia, unspecified cardiac arrhythmia type              MEDICATIONS  (STANDING):  acetaminophen   Tablet .. 975 milliGRAM(s) Oral every 8 hours  amLODIPine   Tablet 10 milliGRAM(s) Oral daily  BUpivacaine liposome 1.3% Injectable (no eMAR) 20 milliLiter(s) Local Injection once  gabapentin 100 milliGRAM(s) Oral three times a day  lactated ringers. 1000 milliLiter(s) (120 mL/Hr) IV Continuous <Continuous>  lisinopril 20 milliGRAM(s) Oral daily  senna 2 Tablet(s) Oral at bedtime    MEDICATIONS  (PRN):  ALPRAZolam 2 milliGRAM(s) Oral three times a day PRN anxiety  benzocaine 15 mG/menthol 3.6 mG (Sugar-Free) Lozenge 2 Lozenge Oral every 4 hours PRN Sore Throat  cyclobenzaprine 5 milliGRAM(s) Oral three times a day PRN Muscle Spasm  HYDROmorphone  Injectable 1 milliGRAM(s) IV Push every 2 hours PRN breakthrough pain  magnesium hydroxide Suspension 30 milliLiter(s) Oral every 12 hours PRN Constipation  metoclopramide Injectable 10 milliGRAM(s) IV Push once PRN Nausea and/or Vomiting  naloxone Injectable 0.1 milliGRAM(s) IV Push every 3 minutes PRN For ANY of the following changes in patient status:  A. RR LESS THAN 10 breaths per minute, B. Oxygen saturation LESS THAN 90%, C. Sedation score of 6  ondansetron Injectable 4 milliGRAM(s) IV Push every 6 hours PRN Nausea  oxyCODONE    IR 30 milliGRAM(s) Oral every 6 hours PRN Severe Pain (7 - 10)          PAST MEDICAL & SURGICAL HISTORY:  HTN (hypertension)    HLD (hyperlipidemia)  denies    Breast cancer  left    Lumbar herniated disc  fall at work, 2019    Kidney cysts  benign    OA (osteoarthritis)    Anxiety    Pain  chronic left breast    S/P   x4    S/P lumpectomy, left breast        REVIEW OF SYSTEMS:  [x] As per HPI          Reviewed   no change                            Changes noted  CONSTITUTIONAL: No fever, weight loss, or fatigue  RESPIRATORY: No cough, wheezing, chills or hemoptysis; No Shortness of Breath  CARDIOVASCULAR: No chest pain, palpitations, dizziness, or leg swelling  GASTROINTESTINAL: No abdominal or epigastric pain. No nausea, vomiting, or hematemesis; No diarrhea or constipation. No melena or hematochezia.  MUSCULOSKELETAL: No joint pain or swelling; No muscle, back, or extremity pain  Neuro:   Grossly  Negative  Psych        Awake  alert  [x] All others negative	  [ ] Unable to obtain      Vital Signs Last 24 Hrs  T(C): 36.8 (15 Jose Alfredo 2021 21:15), Max: 36.8 (15 Jose Alfredo 2021 21:15)  T(F): 98.2 (15 Jose Alfredo 2021 21:15), Max: 98.2 (15 Jose Alfredo 2021 21:15)  HR: 83 (15 Jose Alfredo 2021 21:15) (45 - 101)  BP: 136/63 (15 Jose Alfredo 2021 21:15) (94/54 - 171/85)  BP(mean): 71 (15 Jose Alfredo 2021 14:48) (68 - 117)  RR: 15 (15 Jose Alfredo 2021 21:15) (12 - 29)  SpO2: 99% (15 Jose Alfredo 2021 21:15) (95% - 100%)    PHYSICAL EXAM:      Constitutional:    PHYSICAL EXAM:      Constitutional: NAD, well-groomed, well-developed  HEENT: PERRLA, EOMI, Normal Hearing, MMM  Neck: No LAD, No JVD  Back: Normal spine flexure, No CVA tenderness  Respiratory: CTAB  Cardiovascular: S1 and S2, RRR, no M/G/R  Gastrointestinal: BS+, soft, NT/ND  Extremities: No peripheral edema  Vascular: 2+ peripheral pulses  Neurological: A/O x 3, no focal deficits  Psychiatric: Normal mood, normal affect  Musculoskeletal: 5/5 strength b/l upper and lower extremities  s/p L spine  Skin: No rashes                                  10.2   7.25  )-----------( 234      ( 15 Jose Alfredo 2021 05:09 )             31.0     06-15    139  |  104  |  11  ----------------------------<  139<H>  4.3   |  24  |  0.65    Ca    9.3      15 Jose Alfredo 2021 05:09  Phos  2.9     06-14  Mg     1.7     06-14            CAPILLARY BLOOD GLUCOSE          I&O's Summary    2021 07:  -  15 Jose Alfredo 2021 07:00  --------------------------------------------------------  IN: 1200 mL / OUT: 755 mL / NET: 445 mL    15 Jose Alfredo 2021 07:  -  15 Jose Alfredo 2021 23:30  --------------------------------------------------------  IN: 1140 mL / OUT: 1178 mL / NET: -38 mL            ASSESSMENT/PLAN/RECOMMENDATIONS

## 2021-06-15 NOTE — PHYSICAL THERAPY INITIAL EVALUATION ADULT - PERTINENT HX OF CURRENT PROBLEM, REHAB EVAL
Patient is 62 y/o female with low back pain present x 2 years s/p work accident. She reports constant back pain. She has tried PT, oral analgesics and a cane.

## 2021-06-15 NOTE — PROGRESS NOTE ADULT - SUBJECTIVE AND OBJECTIVE BOX
Ortho Note      Subjective:  Pt comfortable without complaints, pain controlled with Dilaudid PCA. Gillis in place. x2 hv's in place.   Denies CP, SOB, N/V, numbness/tingling       Vital Signs Last 24 Hrs  T(C): 36.2 (06-15-21 @ 06:00), Max: 36.2 (06-15-21 @ 06:00)  T(F): 97.1 (06-15-21 @ 06:00), Max: 97.1 (06-15-21 @ 06:00)  HR: 54 (06-15-21 @ 09:00) (54 - 65)  BP: 115/68 (06-15-21 @ 09:00) (114/66 - 171/85)  BP(mean): 87 (06-15-21 @ 09:00) (85 - 117)  RR: 15 (06-15-21 @ 09:00) (12 - 20)  SpO2: 99% (06-15-21 @ 09:00) (99% - 100%)  AVSS      Objective:    Physical Exam:  General: Pt Alert and oriented, NAD  Lumbar DSG C/D/I  Pulses: +2 pedal pulses, wwp toes, cap refill less than 3 seconds  Sensation: [place] intact  Motor: EHL/FHL/TA/GS- firing        Plan of Care:  A/P: 61yFemale POD#1 s/p TLIF/PSF L4-L5  - afebrile, nontoxic apperance  - Pain Control  - DVT ppx: SCDS  - PT, WBS: WBAT  - monitor drain output  - d/c gillis- TOV  - bowel regimen, IS use  - Dispo- pending pt eval, drain d/c    Ortho Pager [phone] Ortho Note      Subjective:  Pt comfortable without complaints, pain controlled with Dilaudid PCA. Gillis in place. x2 hv's in place.   Denies CP, SOB, N/V, numbness/tingling       Vital Signs Last 24 Hrs  T(C): 36.2 (06-15-21 @ 06:00), Max: 36.2 (06-15-21 @ 06:00)  T(F): 97.1 (06-15-21 @ 06:00), Max: 97.1 (06-15-21 @ 06:00)  HR: 54 (06-15-21 @ 09:00) (54 - 65)  BP: 115/68 (06-15-21 @ 09:00) (114/66 - 171/85)  BP(mean): 87 (06-15-21 @ 09:00) (85 - 117)  RR: 15 (06-15-21 @ 09:00) (12 - 20)  SpO2: 99% (06-15-21 @ 09:00) (99% - 100%)  AVSS      Objective:    Physical Exam:  General: Pt Alert and oriented, NAD  Lumbar DSG C/D/I  Pulses: +2 pedal pulses, wwp toes, cap refill less than 3 seconds  Sensation: [place] intact  Motor: EHL/FHL/TA/GS- firing        Plan of Care:  A/P: 61yFemale POD#1 s/p TLIF/PSF L4-L5  - afebrile, nontoxic apperance  - Pain Control  - DVT ppx: SCDS  - PT, WBS: WBAT  - monitor drain output  - d/c gillis- TOV  - bowel regimen, IS use-  - TTE  - Appreciate pain management Recs  - Dispo- pending pt eval, drain d/c    Ortho Pager [phone]

## 2021-06-15 NOTE — PROGRESS NOTE ADULT - SUBJECTIVE AND OBJECTIVE BOX
Orthopaedic Spine Resident Note    S:  Pain well controlled.    No fevers/chills/shortness of breath/chest pain/new neurologic complaints.  Patient with episodic bradycardia overnight, stayed in PACU  Remainder of VS stable overnight    O:  Vital Signs Last 24 Hrs  T(C): 36.2 (15 Jose Alfredo 2021 06:00), Max: 36.2 (14 Jun 2021 20:46)  T(F): 97.1 (15 Jose Alfredo 2021 06:00), Max: 97.2 (14 Jun 2021 20:46)  HR: 55 (15 Jose Alfredo 2021 09:00) (55 - 80)  BP: 115/68 (15 Jose Alfredo 2021 09:00) (114/66 - 188/98)  BP(mean): 87 (15 Jose Alfredo 2021 09:00) (85 - 133)  RR: 15 (15 Jose Alfredo 2021 09:00) (0 - 20)  SpO2: 100% (15 Jose Alfredo 2021 09:00) (95% - 100%)  General: Well-appearing adult Female lying supine; NAD; A&Ox3; Carter in place  Back: Abd Dressing CDI  Hemovac drains x 2  with 110 and 90  cc sanguinous output since OR  Motor:  LLE- Hip Flex/Knee Ext/TA/EHL/GS 5/5 strength  RLE- Hip Flex/Knee Ext/TA/EHL/GS 5/5 strength  Sensory:  LLE- SILT L2-S1 dermatomes   RLE- SILT L2-S1 dermatomes  Vascular:  Feet WWP; DP 2+ bilaterally; Cap refill < 2 sec    I&O's Detail    14 Jun 2021 07:01  -  15 Jose Alfredo 2021 07:00  --------------------------------------------------------  IN:    IV PiggyBack: 100 mL    Lactated Ringers: 1100 mL  Total IN: 1200 mL    OUT:    Accordian (mL): 110 mL    Accordian (mL): 90 mL    Indwelling Catheter - Urethral (mL): 555 mL  Total OUT: 755 mL    Total NET: 445 mL      15 Jose Alfredo 2021 07:01  -  15 Jose Alfredo 2021 09:13  --------------------------------------------------------  IN:    Lactated Ringers: 120 mL  Total IN: 120 mL    OUT:    Indwelling Catheter - Urethral (mL): 150 mL  Total OUT: 150 mL    Total NET: -30 mL          Labs:                        10.2   7.25  )-----------( 234      ( 15 Jose Alfredo 2021 05:09 )             31.0     15 Jose Alfredo 2021 05:09    139    |  104    |  11     ----------------------------<  139    4.3     |  24     |  0.65     Ca    9.3        15 Jose Aflredo 2021 05:09  Phos  2.9       14 Jun 2021 21:21  Mg     1.7       14 Jun 2021 21:21

## 2021-06-16 LAB
ANION GAP SERPL CALC-SCNC: 10 MMOL/L — SIGNIFICANT CHANGE UP (ref 5–17)
BASOPHILS # BLD AUTO: 0.02 K/UL — SIGNIFICANT CHANGE UP (ref 0–0.2)
BASOPHILS NFR BLD AUTO: 0.2 % — SIGNIFICANT CHANGE UP (ref 0–2)
BUN SERPL-MCNC: 14 MG/DL — SIGNIFICANT CHANGE UP (ref 7–23)
CALCIUM SERPL-MCNC: 9.8 MG/DL — SIGNIFICANT CHANGE UP (ref 8.4–10.5)
CHLORIDE SERPL-SCNC: 102 MMOL/L — SIGNIFICANT CHANGE UP (ref 96–108)
CO2 SERPL-SCNC: 29 MMOL/L — SIGNIFICANT CHANGE UP (ref 22–31)
CREAT SERPL-MCNC: 0.88 MG/DL — SIGNIFICANT CHANGE UP (ref 0.5–1.3)
EOSINOPHIL # BLD AUTO: 0.01 K/UL — SIGNIFICANT CHANGE UP (ref 0–0.5)
EOSINOPHIL NFR BLD AUTO: 0.1 % — SIGNIFICANT CHANGE UP (ref 0–6)
GLUCOSE SERPL-MCNC: 97 MG/DL — SIGNIFICANT CHANGE UP (ref 70–99)
HCT VFR BLD CALC: 28.7 % — LOW (ref 34.5–45)
HGB BLD-MCNC: 8.9 G/DL — LOW (ref 11.5–15.5)
IMM GRANULOCYTES NFR BLD AUTO: 0.7 % — SIGNIFICANT CHANGE UP (ref 0–1.5)
LYMPHOCYTES # BLD AUTO: 1.49 K/UL — SIGNIFICANT CHANGE UP (ref 1–3.3)
LYMPHOCYTES # BLD AUTO: 16.4 % — SIGNIFICANT CHANGE UP (ref 13–44)
MCHC RBC-ENTMCNC: 28.6 PG — SIGNIFICANT CHANGE UP (ref 27–34)
MCHC RBC-ENTMCNC: 31 GM/DL — LOW (ref 32–36)
MCV RBC AUTO: 92.3 FL — SIGNIFICANT CHANGE UP (ref 80–100)
MONOCYTES # BLD AUTO: 0.59 K/UL — SIGNIFICANT CHANGE UP (ref 0–0.9)
MONOCYTES NFR BLD AUTO: 6.5 % — SIGNIFICANT CHANGE UP (ref 2–14)
NEUTROPHILS # BLD AUTO: 6.92 K/UL — SIGNIFICANT CHANGE UP (ref 1.8–7.4)
NEUTROPHILS NFR BLD AUTO: 76.1 % — SIGNIFICANT CHANGE UP (ref 43–77)
NRBC # BLD: 0 /100 WBCS — SIGNIFICANT CHANGE UP (ref 0–0)
PLATELET # BLD AUTO: 237 K/UL — SIGNIFICANT CHANGE UP (ref 150–400)
POTASSIUM SERPL-MCNC: 4 MMOL/L — SIGNIFICANT CHANGE UP (ref 3.5–5.3)
POTASSIUM SERPL-SCNC: 4 MMOL/L — SIGNIFICANT CHANGE UP (ref 3.5–5.3)
RBC # BLD: 3.11 M/UL — LOW (ref 3.8–5.2)
RBC # FLD: 13.5 % — SIGNIFICANT CHANGE UP (ref 10.3–14.5)
SODIUM SERPL-SCNC: 141 MMOL/L — SIGNIFICANT CHANGE UP (ref 135–145)
WBC # BLD: 9.09 K/UL — SIGNIFICANT CHANGE UP (ref 3.8–10.5)
WBC # FLD AUTO: 9.09 K/UL — SIGNIFICANT CHANGE UP (ref 3.8–10.5)

## 2021-06-16 PROCEDURE — 93321 DOPPLER ECHO F-UP/LMTD STD: CPT | Mod: 26

## 2021-06-16 PROCEDURE — 93308 TTE F-UP OR LMTD: CPT | Mod: 26

## 2021-06-16 PROCEDURE — 99232 SBSQ HOSP IP/OBS MODERATE 35: CPT | Mod: GC

## 2021-06-16 RX ORDER — HYDROMORPHONE HYDROCHLORIDE 2 MG/ML
1 INJECTION INTRAMUSCULAR; INTRAVENOUS; SUBCUTANEOUS
Refills: 0 | Status: DISCONTINUED | OUTPATIENT
Start: 2021-06-16 | End: 2021-06-18

## 2021-06-16 RX ORDER — LISINOPRIL 2.5 MG/1
10 TABLET ORAL DAILY
Refills: 0 | Status: DISCONTINUED | OUTPATIENT
Start: 2021-06-16 | End: 2021-06-18

## 2021-06-16 RX ADMIN — LISINOPRIL 10 MILLIGRAM(S): 2.5 TABLET ORAL at 18:15

## 2021-06-16 RX ADMIN — Medication 975 MILLIGRAM(S): at 21:45

## 2021-06-16 RX ADMIN — GABAPENTIN 100 MILLIGRAM(S): 400 CAPSULE ORAL at 13:21

## 2021-06-16 RX ADMIN — Medication 975 MILLIGRAM(S): at 13:20

## 2021-06-16 RX ADMIN — OXYCODONE HYDROCHLORIDE 30 MILLIGRAM(S): 5 TABLET ORAL at 06:30

## 2021-06-16 RX ADMIN — OXYCODONE HYDROCHLORIDE 30 MILLIGRAM(S): 5 TABLET ORAL at 22:45

## 2021-06-16 RX ADMIN — OXYCODONE HYDROCHLORIDE 30 MILLIGRAM(S): 5 TABLET ORAL at 21:45

## 2021-06-16 RX ADMIN — Medication 975 MILLIGRAM(S): at 05:32

## 2021-06-16 RX ADMIN — GABAPENTIN 100 MILLIGRAM(S): 400 CAPSULE ORAL at 21:45

## 2021-06-16 RX ADMIN — GABAPENTIN 100 MILLIGRAM(S): 400 CAPSULE ORAL at 05:32

## 2021-06-16 RX ADMIN — OXYCODONE HYDROCHLORIDE 30 MILLIGRAM(S): 5 TABLET ORAL at 05:32

## 2021-06-16 RX ADMIN — Medication 975 MILLIGRAM(S): at 06:27

## 2021-06-16 RX ADMIN — Medication 975 MILLIGRAM(S): at 22:45

## 2021-06-16 RX ADMIN — Medication 975 MILLIGRAM(S): at 14:00

## 2021-06-16 NOTE — PROGRESS NOTE ADULT - SUBJECTIVE AND OBJECTIVE BOX
Orthopaedic Spine Resident Note    S:   Pain well controlled.    No fevers/chills/shortness of breath/chest pain/new neurologic complaints.  Orthostatic with PT yesterday    Vital Signs Last 24 Hrs  T(C): 36.9 (16 Jun 2021 05:34), Max: 36.9 (16 Jun 2021 05:34)  T(F): 98.4 (16 Jun 2021 05:34), Max: 98.4 (16 Jun 2021 05:34)  HR: 76 (16 Jun 2021 05:34) (45 - 101)  BP: 123/64 (16 Jun 2021 05:34) (94/54 - 155/75)  BP(mean): 71 (15 Jose Alfredo 2021 14:48) (68 - 108)  RR: 15 (16 Jun 2021 05:34) (13 - 29)  SpO2: 98% (16 Jun 2021 05:34) (95% - 100%)    VSS  General: Well-appearing adult Female lying supine; NAD; A&Ox3  Back: Abd Dressing CDI  Hemovac drains x 2  with 25 & 100cc 24 hrs sanguinous output  Motor:  LLE- Hip Flex/Knee Ext/TA/EHL/GS 5/5 strength  RLE- Hip Flex/Knee Ext/TA/EHL/GS 5/5 strength  Sensory:  LLE- SILT L2-S1 dermatomes   RLE- SILT L2-S1 dermatomes  Vascular:  Feet WWP; DP 2+ bilaterally; Cap refill < 2 sec    I&O's Detail    15 Jose Alfredo 2021 07:01  -  16 Jun 2021 07:00  --------------------------------------------------------  IN:    Lactated Ringers: 720 mL    Oral Fluid: 420 mL  Total IN: 1140 mL    OUT:    Accordian (mL): 25 mL    Accordian (mL): 100 mL    Indwelling Catheter - Urethral (mL): 500 mL    Voided (mL): 1350 mL  Total OUT: 1975 mL    Total NET: -835 mL          Labs:                        10.2   7.25  )-----------( 234      ( 15 Jose Alfredo 2021 05:09 )             31.0     15 Jose Alfredo 2021 05:09    139    |  104    |  11     ----------------------------<  139    4.3     |  24     |  0.65     Ca    9.3        15 Jose Alfredo 2021 05:09  Phos  2.9       14 Jun 2021 21:21  Mg     1.7       14 Jun 2021 21:21

## 2021-06-16 NOTE — PROGRESS NOTE ADULT - SUBJECTIVE AND OBJECTIVE BOX
Pain Management Progress Note - [facility] [phone]    HPI: Patient seen and examined today. Patient POD #2 s/p TLIF/PSF L4-5 with Dr. López. Patient reports tolerable level of pain today, rating the pain a 6 out of 10. Patient states pain is adequately controlled with current pain regimen. Patient denies side effects from current pain regimen.    Pertinent PMH: Pain at: _X__Back ___Neck___Knee ___Hip ___Shoulder __X_ Opioid tolerance    Pain is __X_ sharp ____dull ___burning ___achy ___ Intensity: ____ mild ___X_mod ____severe     Location _____Xsurgical site _____cervical ____X_lumbar ____abd _____upper ext____lower ext    Worse with _X___activity ___X_movement _____physical therapy___ Rest    Improved with __X__medication ____rest ____physical therapy    PMH:  HTN (hypertension)  HLD (hyperlipidemia)-denies  Breast cancer-left  Lumbar herniated disc-fall at work, 2019  Kidney cysts-benign  OA (osoarthritis)  Anxiety  Pain-chronic left breast  S/P [id number]  S/P lumpectomy, left breast    Medications:  lisinopril  HYDROmorphone  Injectable  benzocaine 15 mG/menthol 3.6 mG Lozenge  benzocaine 15 mG/menthol 3.6 mG (Sugar-Free) Lozenge  cyclobenzaprine  benzocaine 15 mG/menthol 3.6 mG Lozenge  benzocaine 15 mG/menthol 3.6 mG (Sugar-Free) Lozenge  HYDROmorphone  Injectable  oxyCODONE    IR  potassium chloride  20 mEq/100 mL IVPB  ceFAZolin  Injectable.  ondansetron Injectable  naloxone Injectable  HYDROmorphone PCA (1 mG/mL) Rescue Clinician Bolus  HYDROmorphone PCA (1 mG/mL)  senna  magnesium hydroxide Suspension  ondansetron Injectable  metoclopramide Injectable  ceFAZolin   IVPB  HYDROmorphone  Injectable  acetaminophen   Tablet ..  lactated ringers.  hydrochlorothiazide  amLODIPine   Tablet  ALPRAZolam  gabapentin  lisinopril  BUpivacaine liposome 1.3% Injectable (no eMAR)    ROS: Const:  __N_febrile   Eyes:_N__ENT:___CV: _N__chest pain  Resp: __N__sob  GI:__N_nausea N___vomiting ___N_abd pain ___npo ___clears Y___full diet Y__bm  :_N__ Musk: _Y__pain ___spasm  Skin:_N__ Neuro:  _N__sedation__N_confusion____N numbness __N_weakness __N_paresthesia  Psych:___anxiety  Endo:___ Heme:_N__Allergy:___NKDA     @ 11:0772 mL/min/1.73M2  Hemoglobin: 8.9 g/dL ( @ 11:07)  Hemoglobin: 10.2 g/dL (06-15 @ 05:09)  Hemoglobin: 10.7 g/dL ( @ 21:21)    T(C): 36.9 (21 @ 13:20), Max: 36.9 (21 @ 05:34)  HR: 74 (21 @ 13:20) (61 - 101)  BP: 113/57 (21 @ 13:20) (86/50 - 144/70)  RR: 17 (21 @ 13:20) (14 - 18)  SpO2: 99% (21 @ 13:20) (95% - 100%)  Wt(kg): --     PHYSICAL EXAM:  Gen Appearance: __X_no acute distress __X_appropriate       Neuro: ___SILT feet____ EOM Intact Psych: AAOX3__, _X__mood/affect appropriate        Eyes: _X__conjunctiva WNL  __X___ Pupils equal and round        ENT: __X_ears and nose atraumatic_X__ Hearing grossly intact        Neck: _X__trachea midline, no visible masses ___thyroid without palpable mass    Resp: _X__Nml WOB____No tactile fremitus ___clear to auscultation    Cardio: _X__extremities free from edema ____pedal pulses palpable    GI/Abdomen: ___soft _____ Nontender____X__Nondistended_____HSM    Lymphatic: ___no palpable nodes in neck  ___no palpable nodes calves and feet    Skin/Wound: _X__Incision, X___Dressing c/d/i,   __X__surrounding tissues soft,  _X__drain/chest tube present____    Muscular: EHL _5__/5  Gastrocnemius_5__/5    _X__absent clubbing/cyanosis         ASSESSMENT:  This is a 61y old Female with a history of HLD, OA, kidney cysts, breast cancer, HTN POD #2 s/p TLIF/PSF L4-5 with Dr. López, doing well.    Recommended Treatment PLAN:  1. Continue Oxycodone 30 mg PO Q6 hr PRN severe pain   2. Continue Dilaudid 1 mg PO Q2h PRN breakthrough pain  3. Continue Tylenol 975 mg PO TID  4. Continue Flexeril 5 mg PO Q8h PRN muscle spasm  Plan discussed with Dr. Rojo

## 2021-06-16 NOTE — PROGRESS NOTE ADULT - SUBJECTIVE AND OBJECTIVE BOX
Interval Events: Reviewed  Patient seen and examined at bedside.    Patient is a 61y old  Female who presents with a chief complaint of low back pain (2021 08:31)  back hurts    PAST MEDICAL & SURGICAL HISTORY:  HTN (hypertension)    HLD (hyperlipidemia)  denies    Breast cancer  left    Lumbar herniated disc  fall at work, 2019    Kidney cysts  benign    OA (osteoarthritis)    Anxiety    Pain  chronic left breast    S/P   x4    S/P lumpectomy, left breast        MEDICATIONS:  Pulmonary:    Antimicrobials:    Anticoagulants:    Cardiac:  amLODIPine   Tablet 10 milliGRAM(s) Oral daily  lisinopril 10 milliGRAM(s) Oral daily      Allergies    No Known Allergies    Intolerances        Vital Signs Last 24 Hrs  T(C): 36.4 (2021 08:55), Max: 36.9 (2021 05:34)  T(F): 97.5 (2021 08:55), Max: 98.4 (2021 05:34)  HR: 66 (2021 08:55) (45 - 101)  BP: 94/60 (2021 08:55) (94/54 - 155/75)  BP(mean): 71 (15 Jose Alfredo 2021 14:48) (68 - 108)  RR: 17 (2021 08:55) (13 - 29)  SpO2: 97% (2021 08:55) (95% - 100%)    06-15 @ 07:01  -  06-16 @ 07:00  --------------------------------------------------------  IN: 1140 mL / OUT: 1975 mL / NET: -835 mL          Review of Systems:   •	General: negative  •	Skin/Breast: negative  •	Ophthalmologic: negative  •	ENMT: negative  •	Respiratory and Thorax: negative  •	Cardiovascular: negative  •	Gastrointestinal: negative  •	Genitourinary: negative  •	Musculoskeletal: negative  •	Neurological: negative  •	Psychiatric: negative  •	Hematology/Lymphatics: negative  •	Endocrine: negative  •	Allergic/Immunologic: negative    Physical Exam:   • Constitutional:	refer to the dietion /Nutritionist note  • Eyes:	EOMI; PERRL; no drainage or redness  • ENMT:	No oral lesions; no gross abnormalities  • Neck	No bruits; no thyromegaly or nodules  • Breasts:	not examined  • Back:	No deformity or limitation of movement  • Respiratory:	Breath Sounds equal & clear to percussion & auscultation, no accessory muscle use  • Cardiovascular:	Regular rate & rhythm, normal S1, S2; no murmurs, gallops or rubs; no S3, S4  • Gastrointestinal:	Soft, non-tender, no hepatosplenomegaly, normal bowel sounds  • Genitourinary:	not examined  • Rectal: not examined  • Extremities:	No cyanosis, clubbing or edema  • Vascular:	Equal and normal pulses (carotid, femoral, dorsalis pedis)  • Neurologica:l	not examined  • Skin:	No lesions; no rash  • Lymph Nodes:	No lymphadedenopathy  • Musculoskeletal:	No joint pain, swelling or deformity; no limitation of movement        LABS:      CBC Full  -  ( 15 Jose Alfredo 2021 05:09 )  WBC Count : 7.25 K/uL  RBC Count : 3.46 M/uL  Hemoglobin : 10.2 g/dL  Hematocrit : 31.0 %  Platelet Count - Automated : 234 K/uL  Mean Cell Volume : 89.6 fl  Mean Cell Hemoglobin : 29.5 pg  Mean Cell Hemoglobin Concentration : 32.9 gm/dL  Auto Neutrophil # : 6.54 K/uL  Auto Lymphocyte # : 0.58 K/uL  Auto Monocyte # : 0.09 K/uL  Auto Eosinophil # : 0.00 K/uL  Auto Basophil # : 0.01 K/uL  Auto Neutrophil % : 90.3 %  Auto Lymphocyte % : 8.0 %  Auto Monocyte % : 1.2 %  Auto Eosinophil % : 0.0 %  Auto Basophil % : 0.1 %    06-15    139  |  104  |  11  ----------------------------<  139<H>  4.3   |  24  |  0.65    Ca    9.3      15 Jose Alfredo 2021 05:09  Phos  2.9     -14  Mg     1.7                               RADIOLOGY & ADDITIONAL STUDIES (The following images were personally reviewed):

## 2021-06-16 NOTE — PROGRESS NOTE ADULT - SUBJECTIVE AND OBJECTIVE BOX
. Patient without acute complaints. Pain controlled. Denies CP, SOB, N/V, tactile fevers, calf pain.  Pt is POD #2 s/p TLIF/PSF L4-L5   Patient had episodes of bradyarrhythmia in OR/PACU for which cardiology was consulted; Echo and Echo w bubble performed (no evidence of left to right shunt). Cardiology recs to follow.    Pain management following.       Vital Signs Last 24 Hrs  T(C): 36.4 (16 Jun 2021 08:55), Max: 36.9 (16 Jun 2021 05:34)  T(F): 97.5 (16 Jun 2021 08:55), Max: 98.4 (16 Jun 2021 05:34)  HR: 68 (16 Jun 2021 11:26) (45 - 101)  BP: 106/59 (16 Jun 2021 11:26) (86/50 - 155/75)  BP(mean): 71 (15 Jose Alfredo 2021 14:48) (70 - 108)  RR: 16 (16 Jun 2021 11:26) (13 - 29)  SpO2: 97% (16 Jun 2021 11:26) (95% - 100%)    Physical Exam:  Pt laying comfortably in bed, NAD.  Skin warm and well perfused, no visible erythema/ecchymoses.  Dressing C/D/I; HV x 2 holding suction  EHL/FHL/TA/GS 5/5 bilateral lower extremities  SLT in tact to distal bilateral lower extremities  Calves soft and nontender to palpation  DP pulses palpable bilaterally   COR RRR  Lung Clear  Abd Soft  LABS                        8.9    9.09  )-----------( 237      ( 16 Jun 2021 11:07 )             28.7                                06-16    141  |  102  |  14  ----------------------------<  97  4.0   |  29  |  0.88    Ca    9.8      16 Jun 2021 11:07  Phos  2.9     06-14  Mg     1.7     06-14

## 2021-06-16 NOTE — PROGRESS NOTE ADULT - SUBJECTIVE AND OBJECTIVE BOX
Orthopaedic Surgery Progress Note    Patient seen and examined. CARMEN. Patient without acute complaints. Pain controlled. Denies CP, SOB, N/V, tactile fevers, calf pain.  Pt is POD #2 s/p TLIF/PSF L4-L5   Patient had episodes of bradyarrhythmia in OR/PACU for which cardiology was consulted; Echo and Echo w bubble performed (no evidence of left to right shunt). Cardiology recs to follow.    Pain management following.       Vital Signs Last 24 Hrs  T(C): 36.4 (16 Jun 2021 08:55), Max: 36.9 (16 Jun 2021 05:34)  T(F): 97.5 (16 Jun 2021 08:55), Max: 98.4 (16 Jun 2021 05:34)  HR: 68 (16 Jun 2021 11:26) (45 - 101)  BP: 106/59 (16 Jun 2021 11:26) (86/50 - 155/75)  BP(mean): 71 (15 Jose Alfredo 2021 14:48) (70 - 108)  RR: 16 (16 Jun 2021 11:26) (13 - 29)  SpO2: 97% (16 Jun 2021 11:26) (95% - 100%)    Physical Exam:  Pt laying comfortably in bed, NAD.  Skin warm and well perfused, no visible erythema/ecchymoses.  Dressing C/D/I; HV x 2 holding suction  EHL/FHL/TA/GS 5/5 bilateral lower extremities  SLT in tact to distal bilateral lower extremities  Calves soft and nontender to palpation  DP pulses palpable bilaterally     LABS                        8.9    9.09  )-----------( 237      ( 16 Jun 2021 11:07 )             28.7                                06-16    141  |  102  |  14  ----------------------------<  97  4.0   |  29  |  0.88    Ca    9.8      16 Jun 2021 11:07  Phos  2.9     06-14  Mg     1.7     06-14          A/P: 61F POD #2 s/p TLIF/PSF L4-L5     CONTINUE:        1. PT: WBAT  2. DVT prophylaxis: SCDs  3. Pain Control as needed   4. Appreciate medicine and cardiology recs  5. Dispo: pending

## 2021-06-17 LAB
ANION GAP SERPL CALC-SCNC: 7 MMOL/L — SIGNIFICANT CHANGE UP (ref 5–17)
BASOPHILS # BLD AUTO: 0.06 K/UL — SIGNIFICANT CHANGE UP (ref 0–0.2)
BASOPHILS NFR BLD AUTO: 0.8 % — SIGNIFICANT CHANGE UP (ref 0–2)
BUN SERPL-MCNC: 21 MG/DL — SIGNIFICANT CHANGE UP (ref 7–23)
CALCIUM SERPL-MCNC: 9.4 MG/DL — SIGNIFICANT CHANGE UP (ref 8.4–10.5)
CHLORIDE SERPL-SCNC: 105 MMOL/L — SIGNIFICANT CHANGE UP (ref 96–108)
CO2 SERPL-SCNC: 28 MMOL/L — SIGNIFICANT CHANGE UP (ref 22–31)
CREAT SERPL-MCNC: 1.02 MG/DL — SIGNIFICANT CHANGE UP (ref 0.5–1.3)
EOSINOPHIL # BLD AUTO: 0.16 K/UL — SIGNIFICANT CHANGE UP (ref 0–0.5)
EOSINOPHIL NFR BLD AUTO: 2.1 % — SIGNIFICANT CHANGE UP (ref 0–6)
GLUCOSE SERPL-MCNC: 93 MG/DL — SIGNIFICANT CHANGE UP (ref 70–99)
HCT VFR BLD CALC: 27.2 % — LOW (ref 34.5–45)
HGB BLD-MCNC: 8.5 G/DL — LOW (ref 11.5–15.5)
IMM GRANULOCYTES NFR BLD AUTO: 0.5 % — SIGNIFICANT CHANGE UP (ref 0–1.5)
LYMPHOCYTES # BLD AUTO: 2.26 K/UL — SIGNIFICANT CHANGE UP (ref 1–3.3)
LYMPHOCYTES # BLD AUTO: 29 % — SIGNIFICANT CHANGE UP (ref 13–44)
MCHC RBC-ENTMCNC: 29.4 PG — SIGNIFICANT CHANGE UP (ref 27–34)
MCHC RBC-ENTMCNC: 31.3 GM/DL — LOW (ref 32–36)
MCV RBC AUTO: 94.1 FL — SIGNIFICANT CHANGE UP (ref 80–100)
MONOCYTES # BLD AUTO: 1.03 K/UL — HIGH (ref 0–0.9)
MONOCYTES NFR BLD AUTO: 13.2 % — SIGNIFICANT CHANGE UP (ref 2–14)
NEUTROPHILS # BLD AUTO: 4.25 K/UL — SIGNIFICANT CHANGE UP (ref 1.8–7.4)
NEUTROPHILS NFR BLD AUTO: 54.4 % — SIGNIFICANT CHANGE UP (ref 43–77)
NRBC # BLD: 0 /100 WBCS — SIGNIFICANT CHANGE UP (ref 0–0)
PLATELET # BLD AUTO: 212 K/UL — SIGNIFICANT CHANGE UP (ref 150–400)
POTASSIUM SERPL-MCNC: 3.9 MMOL/L — SIGNIFICANT CHANGE UP (ref 3.5–5.3)
POTASSIUM SERPL-SCNC: 3.9 MMOL/L — SIGNIFICANT CHANGE UP (ref 3.5–5.3)
RBC # BLD: 2.89 M/UL — LOW (ref 3.8–5.2)
RBC # FLD: 13.7 % — SIGNIFICANT CHANGE UP (ref 10.3–14.5)
SODIUM SERPL-SCNC: 140 MMOL/L — SIGNIFICANT CHANGE UP (ref 135–145)
WBC # BLD: 7.8 K/UL — SIGNIFICANT CHANGE UP (ref 3.8–10.5)
WBC # FLD AUTO: 7.8 K/UL — SIGNIFICANT CHANGE UP (ref 3.8–10.5)

## 2021-06-17 PROCEDURE — 72100 X-RAY EXAM L-S SPINE 2/3 VWS: CPT | Mod: 26

## 2021-06-17 PROCEDURE — 99232 SBSQ HOSP IP/OBS MODERATE 35: CPT | Mod: GC

## 2021-06-17 RX ORDER — SODIUM CHLORIDE 9 MG/ML
1000 INJECTION INTRAMUSCULAR; INTRAVENOUS; SUBCUTANEOUS ONCE
Refills: 0 | Status: COMPLETED | OUTPATIENT
Start: 2021-06-17 | End: 2021-06-17

## 2021-06-17 RX ADMIN — OXYCODONE HYDROCHLORIDE 30 MILLIGRAM(S): 5 TABLET ORAL at 15:00

## 2021-06-17 RX ADMIN — Medication 975 MILLIGRAM(S): at 22:58

## 2021-06-17 RX ADMIN — SODIUM CHLORIDE 1000 MILLILITER(S): 9 INJECTION INTRAMUSCULAR; INTRAVENOUS; SUBCUTANEOUS at 11:53

## 2021-06-17 RX ADMIN — Medication 975 MILLIGRAM(S): at 21:58

## 2021-06-17 RX ADMIN — GABAPENTIN 100 MILLIGRAM(S): 400 CAPSULE ORAL at 14:03

## 2021-06-17 RX ADMIN — Medication 975 MILLIGRAM(S): at 14:03

## 2021-06-17 RX ADMIN — GABAPENTIN 100 MILLIGRAM(S): 400 CAPSULE ORAL at 06:46

## 2021-06-17 RX ADMIN — BENZOCAINE AND MENTHOL 2 LOZENGE: 5; 1 LIQUID ORAL at 05:53

## 2021-06-17 RX ADMIN — CYCLOBENZAPRINE HYDROCHLORIDE 5 MILLIGRAM(S): 10 TABLET, FILM COATED ORAL at 06:48

## 2021-06-17 RX ADMIN — GABAPENTIN 100 MILLIGRAM(S): 400 CAPSULE ORAL at 21:58

## 2021-06-17 RX ADMIN — Medication 975 MILLIGRAM(S): at 06:46

## 2021-06-17 RX ADMIN — OXYCODONE HYDROCHLORIDE 30 MILLIGRAM(S): 5 TABLET ORAL at 22:58

## 2021-06-17 RX ADMIN — OXYCODONE HYDROCHLORIDE 30 MILLIGRAM(S): 5 TABLET ORAL at 14:03

## 2021-06-17 RX ADMIN — OXYCODONE HYDROCHLORIDE 30 MILLIGRAM(S): 5 TABLET ORAL at 21:58

## 2021-06-17 RX ADMIN — AMLODIPINE BESYLATE 10 MILLIGRAM(S): 2.5 TABLET ORAL at 11:53

## 2021-06-17 RX ADMIN — Medication 975 MILLIGRAM(S): at 15:00

## 2021-06-17 NOTE — PROGRESS NOTE ADULT - SUBJECTIVE AND OBJECTIVE BOX
Interval Events: Reviewed  Patient seen and examined at bedside.    Patient is a 61y old  Female who presents with a chief complaint of low back pain (2021 22:27)  she is doing better and had BM.  She is participating well in PT    PAST MEDICAL & SURGICAL HISTORY:  HTN (hypertension)    HLD (hyperlipidemia)  denies    Breast cancer  left    Lumbar herniated disc  fall at work, 2019    Kidney cysts  benign    OA (osteoarthritis)    Anxiety    Pain  chronic left breast    S/P   x4    S/P lumpectomy, left breast        MEDICATIONS:  Pulmonary:    Antimicrobials:    Anticoagulants:    Cardiac:  amLODIPine   Tablet 10 milliGRAM(s) Oral daily  lisinopril 10 milliGRAM(s) Oral daily      Allergies    No Known Allergies    Intolerances        Vital Signs Last 24 Hrs  T(C): 37.1 (2021 04:05), Max: 37.3 (2021 17:30)  T(F): 98.7 (2021 04:05), Max: 99.1 (2021 17:30)  HR: 77 (2021 05:06) (77 - 89)  BP: 134/60 (2021 05:06) (88/53 - 134/60)  BP(mean): --  RR: 20 (2021 05:06) (15 - 20)  SpO2: 98% (2021 05:06) (95% - 100%)    06-16 @ 07:01  -  -17 @ 07:00  --------------------------------------------------------  IN: 1560 mL / OUT: 1160 mL / NET: 400 mL    06-17 @ 07:01  -  06-18 @ 06:23  --------------------------------------------------------  IN: 400 mL / OUT: 939 mL / NET: -539 mL          Review of Systems:   •	General: negative  •	Skin/Breast: negative  •	Ophthalmologic: negative  •	ENMT: negative  •	Respiratory and Thorax: negative  •	Cardiovascular: negative  •	Gastrointestinal: negative  •	Genitourinary: negative  •	Musculoskeletal: negative  •	Neurological: negative  •	Psychiatric: negative  •	Hematology/Lymphatics: negative  •	Endocrine: negative  •	Allergic/Immunologic: negative    Physical Exam:   • Constitutional:	refer to the dietion /Nutritionist note  • Eyes:	EOMI; PERRL; no drainage or redness  • ENMT:	No oral lesions; no gross abnormalities  • Neck	No bruits; no thyromegaly or nodules  • Breasts:	not examined  • Back:	No deformity or limitation of movement  • Respiratory:	Breath Sounds equal & clear to percussion & auscultation, no accessory muscle use  • Cardiovascular:	Regular rate & rhythm, normal S1, S2; no murmurs, gallops or rubs; no S3, S4  • Gastrointestinal:	Soft, non-tender, no hepatosplenomegaly, normal bowel sounds  • Genitourinary:	not examined  • Rectal: not examined  • Extremities:	No cyanosis, clubbing or edema  • Vascular:	Equal and normal pulses (carotid, femoral, dorsalis pedis)  • Neurologica:l	not examined  • Skin:	No lesions; no rash  • Lymph Nodes:	No lymphadedenopathy  • Musculoskeletal:	No joint pain, swelling or deformity; no limitation of movement        LABS:      CBC Full  -  ( 2021 06:19 )  WBC Count : 7.80 K/uL  RBC Count : 2.89 M/uL  Hemoglobin : 8.5 g/dL  Hematocrit : 27.2 %  Platelet Count - Automated : 212 K/uL  Mean Cell Volume : 94.1 fl  Mean Cell Hemoglobin : 29.4 pg  Mean Cell Hemoglobin Concentration : 31.3 gm/dL  Auto Neutrophil # : 4.25 K/uL  Auto Lymphocyte # : 2.26 K/uL  Auto Monocyte # : 1.03 K/uL  Auto Eosinophil # : 0.16 K/uL  Auto Basophil # : 0.06 K/uL  Auto Neutrophil % : 54.4 %  Auto Lymphocyte % : 29.0 %  Auto Monocyte % : 13.2 %  Auto Eosinophil % : 2.1 %  Auto Basophil % : 0.8 %        140  |  105  |  21  ----------------------------<  93  3.9   |  28  |  1.02    Ca    9.4      2021 06:19            < from: Echo W Bubble w/o Doppler Complete (21 @ 11:08) >    --------------------------------------------------------------------------------  CONCLUSIONS:     1. Limited study obtained for evaluation of intra-cardiac shunting.   2. Injection of agitated saline via a peripheral vein reveals no evidence of a right-to-left shunt.    < end of copied text >                RADIOLOGY & ADDITIONAL STUDIES (The following images were personally reviewed):

## 2021-06-17 NOTE — PROGRESS NOTE ADULT - SUBJECTIVE AND OBJECTIVE BOX
Orthopaedic Spine Resident Note    S:  No acute overnight events.  Pain well controlled.    No fevers/chills/shortness of breath/chest pain/new neurologic complaints.  Patient still orthostatic with PT yesterday but w improved ambulatory status    Vital Signs Last 24 Hrs  T(C): 36.8 (17 Jun 2021 10:00), Max: 36.9 (16 Jun 2021 13:20)  T(F): 98.2 (17 Jun 2021 10:00), Max: 98.5 (16 Jun 2021 13:20)  HR: 87 (17 Jun 2021 10:00) (68 - 98)  BP: 116/69 (17 Jun 2021 10:00) (88/48 - 121/56)  BP(mean): --  RR: 16 (17 Jun 2021 10:00) (15 - 17)  SpO2: 100% (17 Jun 2021 10:00) (95% - 100%)    VSS  General: Well-appearing adult Female lying supine; NAD; A&Ox3  Back: Abd Dressing CDI  Hemovac drains x 2  with 32 & 128cc 24 hrs sanguinous output  Motor:  LLE- Hip Flex/Knee Ext/TA/EHL/GS 5/5 strength  RLE- Hip Flex/Knee Ext/TA/EHL/GS 5/5 strength  Sensory:  LLE- SILT L2-S1 dermatomes   RLE- SILT L2-S1 dermatomes  Vascular:  Feet WWP; DP 2+ bilaterally; Cap refill < 2 sec    I&O's Detail    16 Jun 2021 07:01  -  17 Jun 2021 07:00  --------------------------------------------------------  IN:    Lactated Ringers: 840 mL    Oral Fluid: 720 mL  Total IN: 1560 mL    OUT:    Accordian (mL): 32 mL    Accordian (mL): 128 mL    Voided (mL): 1000 mL  Total OUT: 1160 mL    Total NET: 400 mL          Labs:                        8.5    7.80  )-----------( 212      ( 17 Jun 2021 06:19 )             27.2     17 Jun 2021 06:19    140    |  105    |  21     ----------------------------<  93     3.9     |  28     |  1.02     Ca    9.4        17 Jun 2021 06:19

## 2021-06-17 NOTE — PROGRESS NOTE ADULT - PROBLEM SELECTOR PLAN 5
The patient is hemodynamically stable.  The pain is controlled.  Patient is on DVT prophylaxis.  Patient is using incentive spirometry.  Oxygen saturation is acceptable.  Advance diet as tolerated.  Advance activity as tolerated.  Monitor for ileus.  Patient is on Laxatives.  Surgical wound is stable.  indication for monitor bed.
The patient is hemodynamically stable.  The pain is controlled.  Patient is on DVT prophylaxis.  Patient is using incentive spirometry.  Oxygen saturation is acceptable.  Advance diet as tolerated.  Advance activity as tolerated.  Monitor for ileus.  Patient is on Laxatives.  Surgical wound is stable.  indication for monitor bed.

## 2021-06-17 NOTE — PROGRESS NOTE ADULT - SUBJECTIVE AND OBJECTIVE BOX
Ortho Note    Subjective:  Pt comfortable without complaints, pain controlled with current pain medication regimen. Lumbar dressing intact. x2 hv drains. Patient hgb 8.5, today, am bp 91/50, patient asymptomatic, denies headache, dizzyness, blurry vision.   Denies CP, SOB, N/V, numbness/tingling       Vital Signs Last 24 Hrs  T(C): 36.8 (06-17-21 @ 10:00), Max: 36.9 (06-17-21 @ 06:37)  T(F): 98.2 (06-17-21 @ 10:00), Max: 98.4 (06-17-21 @ 06:37)  HR: 87 (06-17-21 @ 10:00) (80 - 87)  BP: 116/69 (06-17-21 @ 10:00) (91/50 - 116/69)  BP(mean): --  RR: 16 (06-17-21 @ 10:00) (15 - 16)  SpO2: 100% (06-17-21 @ 10:00) (100% - 100%)  AVSS    Objective:    Physical Exam:  General: Pt Alert and oriented, NAD  Lumbar DSG C/D/I x2 hv  Pulses: +2 pedal pulses, wwp toes, cap refill less than 3 seconds  Sensation: SILT intact  Motor: EHL/FHL/TA/GS- firing        Plan of Care:  A/P: 61yFemale POD#3 s/p TLIF/PSF L4-L5   - afebrile, nontoxic appearance  - hemoglobin- 8.5, patient asymptomatic, continue to trend v/s, and hgb, x1 liter 0.9 normal saline bolus  - Pain Control- tylenol 975mg PO Q8h, gabapentin 100mg PO TID, flexeril 5mg PO Q8h prn muscle spasms,   - DVT ppx: SCDS  - PT, WBS: WBAT  - bowel regimen, IS use  - monitor drain output  - Appreciate Medicine Recs  - Dispo: home pending pt clearance, d/c of drains    Ortho Pager [phone]

## 2021-06-17 NOTE — DIETITIAN INITIAL EVALUATION ADULT. - OTHER INFO
Pt with past medical history significant for HTN, HLD, breast CA.  Pt presents with back pain; now s/p TLIF/PSF L4-L5 (6/14).    Pt endorses good appetite/intake at baseline; follows a Regular diet prior to admission, no restrictions, NKFA.  Pt endorses UBW ~160lbs with significant weight gain over the last few months which she attributes to poor food choices and depression after the loss of multiple family members.  Pt reports making better food choices lately including increasing her vegetable intake.  Pt with good appetite at present; waiting on breakfast tray and time of assessment.  Pt denies n/v/d/c or pain.

## 2021-06-17 NOTE — DIETITIAN INITIAL EVALUATION ADULT. - PROBLEM SELECTOR PLAN 1
Admit to Orthopedic Service   For elective L4/5 lami/posterolateral fusion/TLIF   Medically cleared and optimized for surgery by Dr Wiseman

## 2021-06-17 NOTE — PROGRESS NOTE ADULT - ATTENDING COMMENTS
Initial attending contact date 6/15/21     . See fellow note written above for details. I reviewed the fellow documentation. I have personally seen and examined this patient. I reviewed vitals, labs, medications, cardiac studies, and additional imaging. I agree with the above fellow's findings and plans as written above with the following additions/statements.    62 y/o female with low back pain x 2 years s/p work accident who is now s/p L4/5 laminectomy/posterolateral fusion. During surgery patient had intraoperative bradyarrhythmia (per report by anesthesia) for which cardiology is consulted. Patient now in sinus.     #Concern for bradyarrhythmia  Patient now back in sinus (sinus arrhythmia). No strips of bradyarrhythmia reported but likely 2/2 anesthesia (received versed, , precedex, ketamine).   Telemetry reviewed, no bradyarrhythmias noted overnight or this am  No need for further cardiac work up at this juan      Please reconsult as needed
POD # 2. TLIF/PSF L4-5. Pt evaluated for pain control. Chart reviewed, plan discussed and agreed. Pt reports adequate pain control, no side effects. We will continue the same regimen and titrate to pain control and side effects. We will follow up. Dr. Rojo
POD # 3 s/p TLIF/PSF at L4-5. Pt evaluated for pain control. Chart reviewed, plan discussed and agreed. Pt reports adequate pain control, no side effects. We will continue the same regimen and titrate to pain control and side effects. We will follow up. Dr. Rojo

## 2021-06-17 NOTE — PROGRESS NOTE ADULT - PROBLEM SELECTOR PLAN 2
The blood pressure in the low normal side.  I will decrease lisinopril to 10 mg a day and will follow and may need to adjust further
The blood pressure in the low normal side.  I will decrease lisinopril to 10 mg a day and will follow and may need to adjust further as the bp increased

## 2021-06-17 NOTE — PROGRESS NOTE ADULT - SUBJECTIVE AND OBJECTIVE BOX
Pain Management Progress Note - [facility] [phone]    HPI: Patient seen and examined today. Patient POD #3 s/p TLIF/PSF L4-5 with Dr. López. Patient reports tolerable level of pain today, rating the pain a 7 out of 10. Patient reports adequate pain control with current pain regimen. Patient denies side effects from current pain regimen.    Pertinent PMH: Pain at: _X__Back ___Neck___Knee ___Hip ___Shoulder ___ Opioid tolerance    Pain is __X_ sharp ____dull ___burning ___achy ___ Intensity: ____ mild __X__mod ___X_severe     Location __X___surgical site _____cervical ____X_lumbar ____abd _____upper ext____lower ext    Worse with X____activity ___X_movement _____physical therapy___ Rest    Improved with _X___medication __X__rest ____physical therapy    PMH:  HTN (hypertension)  HLD (hyperlipidemia)-denies  Breast cancer-left  Lumbar herniated disc-fall at work, 2019  Kidney cysts-benign  OA (osoarthritis)  Anxiety  Pain-chronic left breast  S/P [id number]  S/P lumpectomy, left breast    Medications:  sodium chloride 0.9% Bolus  lisinopril  HYDROmorphone  Injectable  benzocaine 15 mG/menthol 3.6 mG Lozenge  benzocaine 15 mG/menthol 3.6 mG (Sugar-Free) Lozenge  cyclobenzaprine  benzocaine 15 mG/menthol 3.6 mG Lozenge  benzocaine 15 mG/menthol 3.6 mG (Sugar-Free) Lozenge  HYDROmorphone  Injectable  oxyCODONE    IR  potassium chloride  20 mEq/100 mL IVPB  ceFAZolin  Injectable.  ondansetron Injectable  naloxone Injectable  HYDROmorphone PCA (1 mG/mL) Rescue Clinician Bolus  senna  magnesium hydroxide Suspension  ondansetron Injectable  metoclopramide Injectable  ceFAZolin   IVPB  HYDROmorphone  Injectable  acetaminophen   Tablet ..  lactated ringers.  hydrochlorothiazide  amLODIPine   Tablet  ALPRAZolam  gabapentin  lisinopril  BUpivacaine liposome 1.3% Injectable (no eMAR)    ROS: Const:  _N__febrile   Eyes:__N_ENT:___CV: __N_chest pain  Resp: _N___sob  GI:_N__nausea _N__vomiting __N__abd pain ___npo ___clears ___full diet _N_bm  :_N__ Musk: _Y__pain _N__spasm  Skin:_N__ Neuro:  __N_sedation_N__confusion__N__ numbness __N_weakness ___Nparesthesia  Psych:__N_anxiety  Endo:___N Heme:___Allergy:_NKDA__     @ 06:1960 mL/min/1.73M2    Hemoglobin: 8.5 g/dL ( @ 06:19)  Hemoglobin: 8.9 g/dL ( @ 11:07)    T(C): 36.8 (21 @ 10:00), Max: 36.9 (21 @ 06:37)  HR: 87 (21 @ 10:00) (72 - 98)  BP: 116/69 (21 @ 10:00) (88/48 - 121/56)  RR: 16 (21 @ 10:00) (15 - 17)  SpO2: 100% (21 @ 10:00) (95% - 100%)  Wt(kg): --     PHYSICAL EXAM:  Gen Appearance: _X__no acute distress __X_appropriate       Neuro: _X__SILT feet____ EOM Intact Psych: AAOX__, ___mood/affect appropriate        Eyes: ___conjunctiva WNL  _____ Pupils equal and round        ENT: ___ears and nose atraumatic___ Hearing grossly intact        Neck: ___trachea midline, no visible masses ___thyroid without palpable mass    Resp: ___Nml WOB____No tactile fremitus ___clear to auscultation    Cardio: ___extremities free from edema ____pedal pulses palpable    GI/Abdomen: ___soft _____ Nontender______Nondistended_____HSM    Lymphatic: ___no palpable nodes in neck  ___no palpable nodes calves and feet    Skin/Wound: ___Incision, ___Dressing c/d/i,   ____surrounding tissues soft,  ___drain/chest tube present____    Muscular: EHL ___/5  Gastrocnemius___/5    ___absent clubbing/cyanosis         ASSESSMENT:  This is a 61y old Female with a history of:  M43.10    Handoff    MEWS Score    HTN (hypertension)    OA (osteoarthritis)    HLD (hyperlipidemia)    Breast cancer    Lumbar herniated disc    Kidney cysts    OA (osteoarthritis)    Anxiety    Pain    Spondylolisthesis    Spondylolisthesis    Spondylolisthesis    HTN (hypertension)    HLD (hyperlipidemia)    Kidney cysts    Breast cancer    Pure hypertriglyceridemia    Essential hypertension    Malignant neoplasm of female breast, unspecified estrogen receptor status, unspecified laterality, unspecified site of breast    Anemia due to acute blood loss    Postoperative state    Cardiac arrhythmia, unspecified cardiac arrhythmia type    TLIF, 1 level, posterior approach    Fusion, posterior spinal column, lumbar, 1 level, posterior approach    S/P     S/P lumpectomy, left breast    SysAdmin_VstLnk          Recommended Treatment PLAN:       Pain Management Progress Note - [facility] [phone]    HPI: Patient seen and examined today. Patient POD #3 s/p TLIF/PSF L4-5 with Dr. López. Patient reports tolerable level of pain today, rating the pain a 7 out of 10. Patient reports adequate pain control with current pain regimen. Patient denies side effects from current pain regimen.    Pertinent PMH: Pain at: _X__Back ___Neck___Knee ___Hip ___Shoulder ___ Opioid tolerance    Pain is __X_ sharp ____dull ___burning ___achy ___ Intensity: ____ mild __X__mod ___X_severe     Location __X___surgical site _____cervical ____X_lumbar ____abd _____upper ext____lower ext    Worse with X____activity ___X_movement _____physical therapy___ Rest    Improved with _X___medication __X__rest ____physical therapy    PMH:  HTN (hypertension)  HLD (hyperlipidemia)-denies  Breast cancer-left  Lumbar herniated disc-fall at work, 2019  Kidney cysts-benign  OA (osoarthritis)  Anxiety  Pain-chronic left breast  S/P [id number]  S/P lumpectomy, left breast    Medications:  sodium chloride 0.9% Bolus  lisinopril  HYDROmorphone  Injectable  benzocaine 15 mG/menthol 3.6 mG Lozenge  benzocaine 15 mG/menthol 3.6 mG (Sugar-Free) Lozenge  cyclobenzaprine  benzocaine 15 mG/menthol 3.6 mG Lozenge  benzocaine 15 mG/menthol 3.6 mG (Sugar-Free) Lozenge  HYDROmorphone  Injectable  oxyCODONE    IR  potassium chloride  20 mEq/100 mL IVPB  ceFAZolin  Injectable.  ondansetron Injectable  naloxone Injectable  HYDROmorphone PCA (1 mG/mL) Rescue Clinician Bolus  senna  magnesium hydroxide Suspension  ondansetron Injectable  metoclopramide Injectable  ceFAZolin   IVPB  HYDROmorphone  Injectable  acetaminophen   Tablet ..  lactated ringers.  hydrochlorothiazide  amLODIPine   Tablet  ALPRAZolam  gabapentin  lisinopril  BUpivacaine liposome 1.3% Injectable (no eMAR)    ROS: Const:  _N__febrile   Eyes:__N_ENT:___CV: __N_chest pain  Resp: _N___sob  GI:_N__nausea _N__vomiting __N__abd pain ___npo ___clears ___full diet _N_bm  :_N__ Musk: _Y__pain _N__spasm  Skin:_N__ Neuro:  __N_sedation_N__confusion__N__ numbness __N_weakness ___Nparesthesia  Psych:__N_anxiety  Endo:___N Heme:___Allergy:_NKDA__     @ 06:1960 mL/min/1.73M2    Hemoglobin: 8.5 g/dL ( @ 06:19)  Hemoglobin: 8.9 g/dL ( @ 11:07)    T(C): 36.8 (21 @ 10:00), Max: 36.9 (21 @ 06:37)  HR: 87 (21 @ 10:00) (72 - 98)  BP: 116/69 (21 @ 10:00) (88/48 - 121/56)  RR: 16 (21 @ 10:00) (15 - 17)  SpO2: 100% (21 @ 10:00) (95% - 100%)  Wt(kg): --     PHYSICAL EXAM:  Gen Appearance: _X__no acute distress __X_appropriate       Neuro: _X__SILT feet____ EOM Intact Psych: AAOX_3_, _X__mood/affect appropriate        Eyes: _X__conjunctiva WNL  ___X__ Pupils equal and round        ENT: __X_ears and nose atraumatic_X__ Hearing grossly intact        Neck: __X_trachea midline, no visible masses ___thyroid without palpable mass    Resp: X___Nml WOB____No tactile fremitus ___clear to auscultation    Cardio: _X__extremities free from edema ____pedal pulses palpable    GI/Abdomen: ___soft _____ Nontender____X__Nondistended_____HSM    Lymphatic: ___no palpable nodes in neck  ___no palpable nodes calves and feet    Skin/Wound: _X__Incision, X___Dressing c/d/i,   ____Xsurrounding tissues soft,  _X__drain/chest tube present____    Muscular: EHL __5_/5  Gastrocnemius_5__/5    _X__absent clubbing/cyanosis         ASSESSMENT:  This is a 61y old Female with a history of HLD, OA, kidney cysts, breast cancer, HTN POD #3 s/p TLIF/PSF L4-5 with Dr. López, doing well.    Recommended Treatment PLAN:  1. Continue Oxycodone 30 mg PO Q6 hr PRN severe pain   2. Continue Dilaudid 1 mg PO Q2h PRN breakthrough pain  3. Continue Tylenol 975 mg PO TID  4. Continue Flexeril 5 mg PO Q8h PRN muscle spasm  Plan discussed with Dr. Rojo

## 2021-06-17 NOTE — PROGRESS NOTE ADULT - SUBJECTIVE AND OBJECTIVE BOX
. Patient without acute complaints. Pain controlled. Denies CP, SOB, N/V, tactile fevers, calf pain.   s/p TLIF/PSF L4-L5  CV stable  Doing well.      Vital Signs Last 24 Hrs  T(C): 37.2 (17 Jun 2021 20:00), Max: 37.3 (17 Jun 2021 17:30)  T(F): 99 (17 Jun 2021 20:00), Max: 99.1 (17 Jun 2021 17:30)  HR: 89 (17 Jun 2021 20:00) (78 - 89)  BP: 108/59 (17 Jun 2021 20:00) (88/48 - 116/69)  BP(mean): --  RR: 18 (17 Jun 2021 20:00) (15 - 18)  SpO2: 97% (17 Jun 2021 20:00) (95% - 100%)    Physical Exam:  Pt laying comfortably in bed, NAD.  Skin warm and well perfused, no visible erythema/ecchymoses.  Dressing C/D/I; HV x 2 holding suction  EHL/FHL/TA/GS 5/5 bilateral lower extremities  SLT in tact to distal bilateral lower extremities  Calves soft and nontender to palpation  DP pulses palpable bilaterally   COR RRR  Lung Clear  Abd Soft BS present                          8.5    7.80  )-----------( 212      ( 17 Jun 2021 06:19 )             27.2                           8.9    9.09  )-----------( 237      ( 16 Jun 2021 11:07 )             28.7                                06-16    141  |  102  |  14  ----------------------------<  97  4.0   |  29  |  0.88    Ca    9.8      16 Jun 2021 11:07  Phos  2.9     06-14  Mg     1.7     06-14

## 2021-06-17 NOTE — DIETITIAN INITIAL EVALUATION ADULT. - OTHER CALCULATIONS
IBW used to calculate energy needs due to pt's current body weight exceeding 120% of IBW (184%).  Needs calculated for age and maintenance nutrient requirements; increased protein needs post-operatively

## 2021-06-17 NOTE — DISCHARGE NOTE NURSING/CASE MANAGEMENT/SOCIAL WORK - PATIENT PORTAL LINK FT
You can access the FollowMyHealth Patient Portal offered by [facility] by registering at the following website: [email or web address]. By joining [facility]’s FollowMyHealth portal, you will also be able to view your health information using other applications (apps) compatible with our system.

## 2021-06-18 VITALS
OXYGEN SATURATION: 97 % | DIASTOLIC BLOOD PRESSURE: 75 MMHG | TEMPERATURE: 98 F | RESPIRATION RATE: 17 BRPM | HEART RATE: 97 BPM | SYSTOLIC BLOOD PRESSURE: 118 MMHG

## 2021-06-18 DIAGNOSIS — M43.17 SPONDYLOLISTHESIS, LUMBOSACRAL REGION: ICD-10-CM

## 2021-06-18 DIAGNOSIS — E78.5 HYPERLIPIDEMIA, UNSPECIFIED: ICD-10-CM

## 2021-06-18 DIAGNOSIS — M51.26 OTHER INTERVERTEBRAL DISC DISPLACEMENT, LUMBAR REGION: ICD-10-CM

## 2021-06-18 LAB
ANION GAP SERPL CALC-SCNC: 9 MMOL/L — SIGNIFICANT CHANGE UP (ref 5–17)
BUN SERPL-MCNC: 16 MG/DL — SIGNIFICANT CHANGE UP (ref 7–23)
CALCIUM SERPL-MCNC: 9.5 MG/DL — SIGNIFICANT CHANGE UP (ref 8.4–10.5)
CHLORIDE SERPL-SCNC: 99 MMOL/L — SIGNIFICANT CHANGE UP (ref 96–108)
CO2 SERPL-SCNC: 30 MMOL/L — SIGNIFICANT CHANGE UP (ref 22–31)
CREAT SERPL-MCNC: 0.9 MG/DL — SIGNIFICANT CHANGE UP (ref 0.5–1.3)
GLUCOSE SERPL-MCNC: 106 MG/DL — HIGH (ref 70–99)
HCT VFR BLD CALC: 27.5 % — LOW (ref 34.5–45)
HGB BLD-MCNC: 8.7 G/DL — LOW (ref 11.5–15.5)
MCHC RBC-ENTMCNC: 30.1 PG — SIGNIFICANT CHANGE UP (ref 27–34)
MCHC RBC-ENTMCNC: 31.6 GM/DL — LOW (ref 32–36)
MCV RBC AUTO: 95.2 FL — SIGNIFICANT CHANGE UP (ref 80–100)
NRBC # BLD: 0 /100 WBCS — SIGNIFICANT CHANGE UP (ref 0–0)
PLATELET # BLD AUTO: 235 K/UL — SIGNIFICANT CHANGE UP (ref 150–400)
POTASSIUM SERPL-MCNC: 3.6 MMOL/L — SIGNIFICANT CHANGE UP (ref 3.5–5.3)
POTASSIUM SERPL-SCNC: 3.6 MMOL/L — SIGNIFICANT CHANGE UP (ref 3.5–5.3)
RBC # BLD: 2.89 M/UL — LOW (ref 3.8–5.2)
RBC # FLD: 13.5 % — SIGNIFICANT CHANGE UP (ref 10.3–14.5)
SODIUM SERPL-SCNC: 138 MMOL/L — SIGNIFICANT CHANGE UP (ref 135–145)
WBC # BLD: 7.37 K/UL — SIGNIFICANT CHANGE UP (ref 3.8–10.5)
WBC # FLD AUTO: 7.37 K/UL — SIGNIFICANT CHANGE UP (ref 3.8–10.5)

## 2021-06-18 PROCEDURE — 85025 COMPLETE CBC W/AUTO DIFF WBC: CPT

## 2021-06-18 PROCEDURE — 93307 TTE W/O DOPPLER COMPLETE: CPT

## 2021-06-18 PROCEDURE — 36415 COLL VENOUS BLD VENIPUNCTURE: CPT

## 2021-06-18 PROCEDURE — 72100 X-RAY EXAM L-S SPINE 2/3 VWS: CPT

## 2021-06-18 PROCEDURE — 85027 COMPLETE CBC AUTOMATED: CPT

## 2021-06-18 PROCEDURE — 84100 ASSAY OF PHOSPHORUS: CPT

## 2021-06-18 PROCEDURE — 83735 ASSAY OF MAGNESIUM: CPT

## 2021-06-18 PROCEDURE — 97116 GAIT TRAINING THERAPY: CPT

## 2021-06-18 PROCEDURE — 86901 BLOOD TYPING SEROLOGIC RH(D): CPT

## 2021-06-18 PROCEDURE — 86803 HEPATITIS C AB TEST: CPT

## 2021-06-18 PROCEDURE — 86850 RBC ANTIBODY SCREEN: CPT

## 2021-06-18 PROCEDURE — 80048 BASIC METABOLIC PNL TOTAL CA: CPT

## 2021-06-18 PROCEDURE — C1889: CPT

## 2021-06-18 PROCEDURE — 86900 BLOOD TYPING SEROLOGIC ABO: CPT

## 2021-06-18 PROCEDURE — 76000 FLUOROSCOPY <1 HR PHYS/QHP: CPT

## 2021-06-18 PROCEDURE — C1713: CPT

## 2021-06-18 PROCEDURE — 93306 TTE W/DOPPLER COMPLETE: CPT

## 2021-06-18 PROCEDURE — 97161 PT EVAL LOW COMPLEX 20 MIN: CPT

## 2021-06-18 PROCEDURE — 97110 THERAPEUTIC EXERCISES: CPT

## 2021-06-18 PROCEDURE — 86769 SARS-COV-2 COVID-19 ANTIBODY: CPT

## 2021-06-18 RX ORDER — SENNA PLUS 8.6 MG/1
2 TABLET ORAL
Qty: 0 | Refills: 0 | DISCHARGE
Start: 2021-06-18

## 2021-06-18 RX ORDER — OXYCODONE HYDROCHLORIDE 5 MG/1
1 TABLET ORAL
Qty: 0 | Refills: 0 | DISCHARGE

## 2021-06-18 RX ORDER — OXYCODONE HYDROCHLORIDE 5 MG/1
1 TABLET ORAL
Qty: 20 | Refills: 0
Start: 2021-06-18 | End: 2021-06-22

## 2021-06-18 RX ORDER — CYCLOBENZAPRINE HYDROCHLORIDE 10 MG/1
1 TABLET, FILM COATED ORAL
Qty: 0 | Refills: 0 | DISCHARGE
Start: 2021-06-18

## 2021-06-18 RX ORDER — OXYCODONE HYDROCHLORIDE 5 MG/1
15 TABLET ORAL EVERY 6 HOURS
Refills: 0 | Status: DISCONTINUED | OUTPATIENT
Start: 2021-06-18 | End: 2021-06-18

## 2021-06-18 RX ORDER — ACETAMINOPHEN 500 MG
3 TABLET ORAL
Qty: 0 | Refills: 0 | DISCHARGE
Start: 2021-06-18

## 2021-06-18 RX ADMIN — LISINOPRIL 10 MILLIGRAM(S): 2.5 TABLET ORAL at 05:10

## 2021-06-18 RX ADMIN — OXYCODONE HYDROCHLORIDE 30 MILLIGRAM(S): 5 TABLET ORAL at 20:27

## 2021-06-18 RX ADMIN — GABAPENTIN 100 MILLIGRAM(S): 400 CAPSULE ORAL at 05:10

## 2021-06-18 RX ADMIN — Medication 975 MILLIGRAM(S): at 06:40

## 2021-06-18 RX ADMIN — GABAPENTIN 100 MILLIGRAM(S): 400 CAPSULE ORAL at 16:27

## 2021-06-18 RX ADMIN — OXYCODONE HYDROCHLORIDE 15 MILLIGRAM(S): 5 TABLET ORAL at 09:56

## 2021-06-18 RX ADMIN — OXYCODONE HYDROCHLORIDE 30 MILLIGRAM(S): 5 TABLET ORAL at 10:40

## 2021-06-18 RX ADMIN — Medication 975 MILLIGRAM(S): at 16:27

## 2021-06-18 RX ADMIN — Medication 975 MILLIGRAM(S): at 05:40

## 2021-06-18 RX ADMIN — OXYCODONE HYDROCHLORIDE 30 MILLIGRAM(S): 5 TABLET ORAL at 19:07

## 2021-06-18 RX ADMIN — Medication 975 MILLIGRAM(S): at 17:36

## 2021-06-18 RX ADMIN — AMLODIPINE BESYLATE 10 MILLIGRAM(S): 2.5 TABLET ORAL at 09:57

## 2021-06-18 NOTE — PROGRESS NOTE ADULT - SUBJECTIVE AND OBJECTIVE BOX
Ortho Note    Subjective:  Pt comfortable without complaints, pain controlled with current pain medication regimen. patient with x2 hv.   Denies CP, SOB, N/V, numbness/tingling       Vital Signs Last 24 Hrs  T(C): 37.2 (06-18-21 @ 09:00), Max: 37.2 (06-18-21 @ 09:00)  T(F): 98.9 (06-18-21 @ 09:00), Max: 98.9 (06-18-21 @ 09:00)  HR: 103 (06-18-21 @ 09:00) (77 - 103)  BP: 117/68 (06-18-21 @ 09:00) (108/60 - 134/60)  BP(mean): --  RR: 19 (06-18-21 @ 09:00) (16 - 20)  SpO2: 100% (06-18-21 @ 09:00) (95% - 100%)  AVSS      Objective:    Physical Exam:  General: Pt Alert and oriented, NAD  Lumbar dressing C/D/I x2 hv  Pulses: +2 pedal pulses, wwp toes, cap refill less than 3 seconds  Sensation: SILT intact  Motor: EHL/FHL/TA/GS- firing        Plan of Care:  A/P: 61yFemale POD#4 s/p TLIF/PSF L4-L5  - afebrile, nontoxic appearance  - Pain Control- oxycodone 15-30mg PO Q6h prn moderate to severe pain , Dilaudid 1mg Q2h IVP prn breakthrough pain, Flexeril 5mg PO TID prn muscle spasms, tylenol 975mg PO Q8h, gabapentin 100mg PO TID  - DVT ppx: SCDS  - PT, WBS: WBAT  - bowel regimen, IS use  - monitor drain output  - Dispo: home pending pt clearance, and drain d/c    Ortho Pager [phone]

## 2021-06-18 NOTE — PROGRESS NOTE ADULT - ASSESSMENT
A/P: 61y Female s/p L4-5 TLIF/PSF on 06/14    - Stable  - Pain control as per Pain mgmt recs  - Nausea control/Bowel regiment  - Home meds  - PT: OOB this AM  - Cards recs appreciated -- Echo/Bubble study unremarkable, no further inpatient cardiac workup required per cards team  - WBAT  - Passed TOV  - Monitor drain output -- keep drains for now until mobilizing well w PT  - AM labs stable  - DVT ppx: SCDs  - Likely plan to d/c tmrw pending drains/PT progression    Ortho Pager [phone]
A/P: 61y Female s/p L4-5 TLIF/PSF on 06/14    - Stable  - Pain control as per Pain mgmt recs  - Nausea control/Bowel regiment  - Home meds  - PT: OOB this AM  - Cards recs appreciated -- f/u echo this AM  - WBAT  - Pull Carter today for TOV  - Monitor drain output -- keep drains for now  - AM labs stable  - DVT ppx: SCDs    Ortho Pager [phone]
s/p L disc  doing well  CV stable: cardiac input appreciated  Diet  OOB  VTE: SCD
A/P: 61y Female s/p L4-5 TLIF/PSF on 06/14    - Stable  - Pain control as per Pain mgmt recs  - Nausea control/Bowel regiment  - Home meds  - PT: OOB this AM  - Cards recs appreciated -- Echo unremarkable, no further inpatient cardiac workup required per cards team  - WBAT  - Passed TOV  - Monitor drain output -- keep drains for now until mobilizing well w PT  - AM labs stable  - DVT ppx: SCDs    Ortho Pager [phone]  
A/P: 61y Female s/p L4-5 TLIF/PSF on 06/14    - Stable  - Pain control as per Pain mgmt recs  - Nausea control/Bowel regiment  - Home meds  - PT: OOB this AM  - Cards recs appreciated -- Echo/Bubble study unremarkable, no further inpatient cardiac workup required per cards team  - WBAT  - Passed TOV  - Monitor drain output -- plan to pull drains if clears PT for d/c home  - AM labs stable  - DVT ppx: SCDs  - Likely plan to d/c today pending drains/PT progression    Ortho Pager [phone]
s/p L disc  doing well  CV stable: cardiac input appreciated  Diet  OOB  VTE: SCD
s/p L disc  doing well  CV stable: cardiac input appreciated  Diet  OOB  VTE: SCD    --HTN controlled

## 2021-06-18 NOTE — PROGRESS NOTE ADULT - PROBLEM SELECTOR PROBLEM 6
Lumbar herniated disc
Cardiac arrhythmia, unspecified cardiac arrhythmia type
Cardiac arrhythmia, unspecified cardiac arrhythmia type

## 2021-06-18 NOTE — PROGRESS NOTE ADULT - SUBJECTIVE AND OBJECTIVE BOX
Orthopaedic Spine Resident Note    S:  No acute overnight events.  Pain well controlled.    No fevers/chills/shortness of breath/chest pain/new neurologic complaints.  Patient ambulatory status much improved since prior orthostasis episodes    Vital Signs Last 24 Hrs  T(C): 37.2 (18 Jun 2021 09:00), Max: 37.3 (17 Jun 2021 17:30)  T(F): 98.9 (18 Jun 2021 09:00), Max: 99.1 (17 Jun 2021 17:30)  HR: 103 (18 Jun 2021 09:00) (77 - 103)  BP: 117/68 (18 Jun 2021 09:00) (88/53 - 134/60)  BP(mean): --  RR: 19 (18 Jun 2021 09:00) (16 - 20)  SpO2: 100% (18 Jun 2021 09:00) (95% - 100%)      VSS  General: Well-appearing adult Female lying supine; NAD; A&Ox3  Back: Abd Dressing CDI  Hemovac drains x 2  with 47 & 42cc 24 hrs sanguinous output  Motor:  LLE- Hip Flex/Knee Ext/TA/EHL/GS 5/5 strength  RLE- Hip Flex/Knee Ext/TA/EHL/GS 5/5 strength  Sensory:  LLE- SILT L2-S1 dermatomes   RLE- SILT L2-S1 dermatomes  Vascular:  Feet WWP; DP 2+ bilaterally; Cap refill < 2 sec    I&O's Detail    17 Jun 2021 07:01  -  18 Jun 2021 07:00  --------------------------------------------------------  IN:    Oral Fluid: 400 mL  Total IN: 400 mL    OUT:    Accordian (mL): 47 mL    Accordian (mL): 42 mL    Voided (mL): 850 mL  Total OUT: 939 mL    Total NET: -539 mL      18 Jun 2021 07:01  -  18 Jun 2021 13:24  --------------------------------------------------------  IN:    Oral Fluid: 590 mL  Total IN: 590 mL    OUT:    Accordian (mL): 30 mL    Accordian (mL): 4 mL  Total OUT: 34 mL    Total NET: 556 mL          Labs:                        8.7    7.37  )-----------( 235      ( 18 Jun 2021 10:44 )             27.5     18 Jun 2021 10:44    138    |  99     |  16     ----------------------------<  106    3.6     |  30     |  0.90     Ca    9.5        18 Jun 2021 10:44

## 2021-06-18 NOTE — PROGRESS NOTE ADULT - REASON FOR ADMISSION
low back pain

## 2021-06-18 NOTE — PROGRESS NOTE ADULT - PROBLEM SELECTOR PROBLEM 3
Malignant neoplasm of female breast, unspecified estrogen receptor status, unspecified laterality, unspecified site of breast
Pure hypertriglyceridemia
Malignant neoplasm of female breast, unspecified estrogen receptor status, unspecified laterality, unspecified site of breast

## 2021-06-18 NOTE — DISCHARGE NOTE PROVIDER - NSDCMRMEDTOKEN_GEN_ALL_CORE_FT
amLODIPine 10 mg oral tablet: 1 tab(s) orally once a day  gabapentin 100 mg oral capsule: 1 cap(s) orally 3 times a day  hydroCHLOROthiazide 50 mg oral tablet: 1 tab(s) orally once a day  lisinopril 20 mg oral tablet: 1 tab(s) orally once a day  meloxicam 7.5 mg oral tablet: 1 tab(s) orally once a day, As Needed  oxyCODONE 30 mg oral tablet: 1 tab(s) orally every 6 hours, As Needed  Xanax 2 mg oral tablet: 1 tab(s) orally 3 times a day, As Needed   acetaminophen 325 mg oral tablet: 3 tab(s) orally every 8 hours  amLODIPine 10 mg oral tablet: 1 tab(s) orally once a day  cyclobenzaprine 5 mg oral tablet: 1 tab(s) orally 3 times a day, As needed, Muscle Spasm  gabapentin 100 mg oral capsule: 1 cap(s) orally 3 times a day  hydroCHLOROthiazide 50 mg oral tablet: 1 tab(s) orally once a day  lisinopril 20 mg oral tablet: 1 tab(s) orally once a day  meloxicam 7.5 mg oral tablet: 1 tab(s) orally once a day, As Needed  oxyCODONE 30 mg oral tablet: 1 tab(s) orally every 6 hours, As needed, Severe Pain (7 - 10) MDD:4  senna oral tablet: 2 tab(s) orally once a day (at bedtime)  Xanax 2 mg oral tablet: 1 tab(s) orally 3 times a day, As Needed

## 2021-06-18 NOTE — DISCHARGE NOTE PROVIDER - NSDCCPTREATMENT_GEN_ALL_CORE_FT
PRINCIPAL PROCEDURE  Procedure: TLIF, 1 level, posterior approach  Findings and Treatment: spondylolisthesis

## 2021-06-18 NOTE — PROGRESS NOTE ADULT - TIME BILLING
Patient seen and examined with house-staff during bedside rounds.  Resident note read, including vitals, physical findings, laboratory data, and radiological reports.   Revisions included below.  Direct personal management at bed side and extensive interpretation of the data.  Plan was outlined and discussed in details with the housestaff.  Decision making of high complexity  Action taken for acute disease activity to reflect the level of care provided:  - medication reconciliation  - review laboratory data
reviewed
as noted above
reviewed
Patient seen and examined with house-staff during bedside rounds.  Resident note read, including vitals, physical findings, laboratory data, and radiological reports.   Revisions included below.  Direct personal management at bed side and extensive interpretation of the data.  Plan was outlined and discussed in details with the housestaff.  Decision making of high complexity  Action taken for acute disease activity to reflect the level of care provided:  - medication reconciliation  - review laboratory data

## 2021-06-18 NOTE — DISCHARGE NOTE PROVIDER - HOSPITAL COURSE
Admitted- 6-  Surgery- s/p TLIF/PSF L4-L5  Ana-op Antibiotics  Pain control  DVT prophylaxis  OOB/Physical Therapy  Medicine Consult

## 2021-06-18 NOTE — PROGRESS NOTE ADULT - NSICDXPILOT_GEN_ALL_CORE
[place]

## 2021-06-18 NOTE — PROGRESS NOTE ADULT - SUBJECTIVE AND OBJECTIVE BOX
. Patient without acute complaints. Pain controlled. Denies CP, SOB, N/V, tactile fevers, calf pain.   s/p TLIF/PSF L4-L5  CV stable  Doing well.      Vital Signs Last 24 Hrs  Vital Signs Last 24 Hrs  T(C): 37.2 (18 Jun 2021 09:00), Max: 37.2 (17 Jun 2021 20:00)  T(F): 98.9 (18 Jun 2021 09:00), Max: 99 (17 Jun 2021 20:00)  HR: 103 (18 Jun 2021 09:00) (77 - 103)  BP: 117/68 (18 Jun 2021 09:00) (88/53 - 134/60)  BP(mean): --  RR: 19 (18 Jun 2021 09:00) (16 - 20)  SpO2: 100% (18 Jun 2021 09:00) (95% - 100%)    Physical Exam:  Pt laying comfortably in bed, NAD.  Skin warm and well perfused, no visible erythema/ecchymoses.  Dressing C/D/I;   EHL/FHL/TA/GS 5/5 bilateral lower extremities  SLT in tact to distal bilateral lower extremities  Calves soft and nontender to palpation  DP pulses palpable bilaterally   COR RRR  Lung Clear  Abd Soft BS present                              8.7    7.37  )-----------( 235      ( 18 Jun 2021 10:44 )             27.5                     8.5    7.80  )-----------( 212      ( 17 Jun 2021 06:19 )             27.2                           8.9    9.09  )-----------( 237      ( 16 Jun 2021 11:07 )             28.7   06-18    138  |  99  |  16  ----------------------------<  106<H>  3.6   |  30  |  0.90    Ca    9.5      18 Jun 2021 10:44                                 06-16    141  |  102  |  14  ----------------------------<  97  4.0   |  29  |  0.88    Ca    9.8      16 Jun 2021 11:07  Phos  2.9     06-14  Mg     1.7     06-14

## 2021-06-18 NOTE — PROGRESS NOTE ADULT - PROVIDER SPECIALTY LIST ADULT
Orthopedics
Internal Medicine
Internal Medicine
Orthopedics
Pain Medicine
Cardiology
Orthopedics
Orthopedics
Pain Medicine
Critical Care
Internal Medicine
Internal Medicine

## 2021-06-18 NOTE — DISCHARGE NOTE PROVIDER - NSDCFUADDINST_GEN_ALL_CORE_FT
Weight bear as tolerated with assistive device.  No strenuous activity, heavy lifting, driving or returning to work until cleared by MD.  You may shower - dressing is water-resistant, no soaking in bathtubs.  Remove dressing after post op day 5-7, then leave incision open to air. Keep incision clean and dry.  Try to have regular bowel movements, take stool softener or laxative if necessary.  Swelling may travel all the way down leg to foot, this is normal and will subside in a few weeks.  Call to schedule an appt with Dr. López for follow up, if you have staples or sutures they will be removed in office.  Contact your doctor if you experience: fever greater than 101.5, chills, chest pain, difficulty breathing, redness or excessive drainage around the incision, other concerns.  Follow up with your primary care provider.

## 2021-06-18 NOTE — DISCHARGE NOTE PROVIDER - NSDCCPCAREPLAN_GEN_ALL_CORE_FT
PRINCIPAL DISCHARGE DIAGNOSIS  Diagnosis: Spondylolisthesis  Assessment and Plan of Treatment: s/p TLIF/PSF L4-L5

## 2021-06-18 NOTE — DISCHARGE NOTE PROVIDER - CARE PROVIDER_API CALL
Ramon López)  Orthopaedic Surgery  [address]  [place], [place] [address]  Phone: [phone]  Fax: [phone]  Follow Up Time: 2 weeks

## 2021-06-18 NOTE — PROGRESS NOTE ADULT - PROBLEM SELECTOR PROBLEM 2
Malignant neoplasm of breast in male, estrogen receptor positive, unspecified laterality, unspecified site of breast
Essential hypertension
Essential hypertension

## 2021-06-24 DIAGNOSIS — Z98.890 OTHER SPECIFIED POSTPROCEDURAL STATES: ICD-10-CM

## 2021-06-24 DIAGNOSIS — F32.9 MAJOR DEPRESSIVE DISORDER, SINGLE EPISODE, UNSPECIFIED: ICD-10-CM

## 2021-06-24 DIAGNOSIS — I49.8 OTHER SPECIFIED CARDIAC ARRHYTHMIAS: ICD-10-CM

## 2021-06-24 DIAGNOSIS — M43.16 SPONDYLOLISTHESIS, LUMBAR REGION: ICD-10-CM

## 2021-06-24 DIAGNOSIS — M48.061 SPINAL STENOSIS, LUMBAR REGION WITHOUT NEUROGENIC CLAUDICATION: ICD-10-CM

## 2021-06-24 DIAGNOSIS — Z85.3 PERSONAL HISTORY OF MALIGNANT NEOPLASM OF BREAST: ICD-10-CM

## 2021-06-24 DIAGNOSIS — E66.9 OBESITY, UNSPECIFIED: ICD-10-CM

## 2021-06-24 DIAGNOSIS — D62 ACUTE POSTHEMORRHAGIC ANEMIA: ICD-10-CM

## 2021-06-24 DIAGNOSIS — I10 ESSENTIAL (PRIMARY) HYPERTENSION: ICD-10-CM

## 2021-06-24 DIAGNOSIS — M53.2X6 SPINAL INSTABILITIES, LUMBAR REGION: ICD-10-CM

## 2021-06-24 DIAGNOSIS — M51.16 INTERVERTEBRAL DISC DISORDERS WITH RADICULOPATHY, LUMBAR REGION: ICD-10-CM

## 2021-06-24 DIAGNOSIS — M54.16 RADICULOPATHY, LUMBAR REGION: ICD-10-CM

## 2021-06-24 DIAGNOSIS — E78.5 HYPERLIPIDEMIA, UNSPECIFIED: ICD-10-CM

## 2021-06-24 DIAGNOSIS — K21.9 GASTRO-ESOPHAGEAL REFLUX DISEASE WITHOUT ESOPHAGITIS: ICD-10-CM

## 2021-06-24 DIAGNOSIS — T88.59XA OTHER COMPLICATIONS OF ANESTHESIA, INITIAL ENCOUNTER: ICD-10-CM

## 2021-06-24 DIAGNOSIS — N28.1 CYST OF KIDNEY, ACQUIRED: ICD-10-CM

## 2021-06-24 DIAGNOSIS — F41.9 ANXIETY DISORDER, UNSPECIFIED: ICD-10-CM

## 2022-09-01 NOTE — DIETITIAN INITIAL EVALUATION ADULT. - PROBLEM/PLAN-3
LM for Pt to call back to schedule hospital follow up at the [facility]  DISPLAY PLAN FREE TEXT

## 2023-12-15 NOTE — DIETITIAN INITIAL EVALUATION ADULT. - LAB (SPECIFY)
[Nutrition/ Exercise/ Weight Management] : nutrition, exercise, weight management [Vitamins/Supplements] : vitamins/supplements [Breast Self Exam] : breast self exam per team

## 2024-12-12 NOTE — PATIENT PROFILE ADULT - NSPRESCRALCSCORE_GEN_A_NUR_CAL
FAMILY HISTORY:  Mother  Still living? Unknown  Family history of DVT, Age at diagnosis: Age Unknown     3